# Patient Record
Sex: MALE | NOT HISPANIC OR LATINO | Employment: OTHER | URBAN - METROPOLITAN AREA
[De-identification: names, ages, dates, MRNs, and addresses within clinical notes are randomized per-mention and may not be internally consistent; named-entity substitution may affect disease eponyms.]

---

## 2023-09-15 PROBLEM — I10 ESSENTIAL HYPERTENSION: Status: ACTIVE | Noted: 2023-09-15

## 2023-09-15 RX ORDER — DEXTROAMPHETAMINE SACCHARATE, AMPHETAMINE ASPARTATE, DEXTROAMPHETAMINE SULFATE AND AMPHETAMINE SULFATE 5; 5; 5; 5 MG/1; MG/1; MG/1; MG/1
20 TABLET ORAL 2 TIMES DAILY
COMMUNITY

## 2023-09-15 RX ORDER — EPINEPHRINE 0.3 MG/.3ML
1 INJECTION SUBCUTANEOUS
COMMUNITY
Start: 2016-09-22

## 2023-09-15 RX ORDER — ONDANSETRON 4 MG/1
TABLET, ORALLY DISINTEGRATING ORAL 3 TIMES DAILY
COMMUNITY
Start: 2018-11-15 | End: 2023-10-16 | Stop reason: WASHOUT

## 2023-09-15 RX ORDER — NORTRIPTYLINE HYDROCHLORIDE 10 MG/1
1 CAPSULE ORAL DAILY
COMMUNITY
End: 2023-10-16 | Stop reason: WASHOUT

## 2023-09-15 RX ORDER — ALBUTEROL SULFATE 90 UG/1
2 AEROSOL, METERED RESPIRATORY (INHALATION) EVERY 4 HOURS PRN
COMMUNITY
Start: 2016-11-08

## 2023-09-15 RX ORDER — DIVALPROEX SODIUM 500 MG/1
500 TABLET, FILM COATED, EXTENDED RELEASE ORAL 2 TIMES DAILY
COMMUNITY
End: 2023-10-16 | Stop reason: WASHOUT

## 2023-09-15 RX ORDER — CARISOPRODOL 350 MG/1
350 TABLET ORAL 3 TIMES DAILY
COMMUNITY
End: 2023-10-16 | Stop reason: WASHOUT

## 2023-09-15 RX ORDER — GABAPENTIN 800 MG/1
800 TABLET ORAL 3 TIMES DAILY
COMMUNITY
End: 2023-10-05

## 2023-09-15 RX ORDER — TESTOSTERONE CYPIONATE 1000 MG/10ML
100 INJECTION, SOLUTION INTRAMUSCULAR WEEKLY
COMMUNITY
Start: 2018-12-28

## 2023-09-15 RX ORDER — DILTIAZEM HYDROCHLORIDE 180 MG/1
1 CAPSULE, COATED, EXTENDED RELEASE ORAL DAILY
COMMUNITY
End: 2023-10-16 | Stop reason: WASHOUT

## 2023-09-15 RX ORDER — CLONIDINE HYDROCHLORIDE 0.1 MG/1
0.1 TABLET ORAL DAILY
COMMUNITY
End: 2023-10-16 | Stop reason: WASHOUT

## 2023-09-15 RX ORDER — NALOXONE HYDROCHLORIDE 4 MG/.1ML
4 SPRAY NASAL
COMMUNITY
Start: 2021-07-27

## 2023-09-15 RX ORDER — ROPINIROLE 1 MG/1
1 TABLET, FILM COATED ORAL
COMMUNITY
Start: 2016-03-10 | End: 2023-10-16 | Stop reason: WASHOUT

## 2023-10-05 DIAGNOSIS — M25.561 CHRONIC PAIN OF BOTH KNEES: ICD-10-CM

## 2023-10-05 DIAGNOSIS — G89.29 CHRONIC PAIN OF BOTH KNEES: ICD-10-CM

## 2023-10-05 DIAGNOSIS — M79.672 LEFT FOOT PAIN: Primary | ICD-10-CM

## 2023-10-05 DIAGNOSIS — M54.12 RADICULOPATHY, CERVICAL: ICD-10-CM

## 2023-10-05 DIAGNOSIS — M25.562 CHRONIC PAIN OF BOTH KNEES: ICD-10-CM

## 2023-10-05 RX ORDER — GABAPENTIN 800 MG/1
800 TABLET ORAL 3 TIMES DAILY
Qty: 90 TABLET | Refills: 2 | Status: SHIPPED | OUTPATIENT
Start: 2023-10-05 | End: 2023-10-16 | Stop reason: SDUPTHER

## 2023-10-16 ENCOUNTER — OFFICE VISIT (OUTPATIENT)
Dept: PAIN MEDICINE | Facility: CLINIC | Age: 43
End: 2023-10-16
Payer: MEDICARE

## 2023-10-16 VITALS
SYSTOLIC BLOOD PRESSURE: 136 MMHG | HEIGHT: 75 IN | WEIGHT: 240 LBS | DIASTOLIC BLOOD PRESSURE: 110 MMHG | BODY MASS INDEX: 29.84 KG/M2 | HEART RATE: 108 BPM

## 2023-10-16 DIAGNOSIS — M54.12 CERVICAL RADICULOPATHY: ICD-10-CM

## 2023-10-16 DIAGNOSIS — M54.12 RADICULOPATHY, CERVICAL: ICD-10-CM

## 2023-10-16 DIAGNOSIS — M25.562 CHRONIC PAIN OF BOTH KNEES: Primary | ICD-10-CM

## 2023-10-16 DIAGNOSIS — M25.561 CHRONIC PAIN OF BOTH KNEES: Primary | ICD-10-CM

## 2023-10-16 DIAGNOSIS — Z98.1 HISTORY OF ANKLE FUSION: ICD-10-CM

## 2023-10-16 DIAGNOSIS — G89.29 CHRONIC PAIN OF BOTH KNEES: Primary | ICD-10-CM

## 2023-10-16 PROCEDURE — 3075F SYST BP GE 130 - 139MM HG: CPT | Performed by: PHYSICIAN ASSISTANT

## 2023-10-16 PROCEDURE — 3080F DIAST BP >= 90 MM HG: CPT | Performed by: PHYSICIAN ASSISTANT

## 2023-10-16 PROCEDURE — 99214 OFFICE O/P EST MOD 30 MIN: CPT | Performed by: PHYSICIAN ASSISTANT

## 2023-10-16 RX ORDER — TADALAFIL 5 MG/1
5 TABLET ORAL
COMMUNITY
Start: 2023-03-13

## 2023-10-16 RX ORDER — OXYCODONE AND ACETAMINOPHEN 5; 325 MG/1; MG/1
1 TABLET ORAL EVERY 6 HOURS PRN
Qty: 100 TABLET | Refills: 0 | Status: SHIPPED | OUTPATIENT
Start: 2023-10-16 | End: 2023-11-21 | Stop reason: WASHOUT

## 2023-10-16 RX ORDER — GABAPENTIN 800 MG/1
800 TABLET ORAL 3 TIMES DAILY
Qty: 90 TABLET | Refills: 2 | Status: SHIPPED | OUTPATIENT
Start: 2023-10-16 | End: 2024-02-29 | Stop reason: SDUPTHER

## 2023-10-16 RX ORDER — OXYCODONE AND ACETAMINOPHEN 5; 325 MG/1; MG/1
TABLET ORAL
COMMUNITY
Start: 2023-09-18 | End: 2023-10-16 | Stop reason: SDUPTHER

## 2023-10-16 RX ORDER — PREDNISONE 20 MG/1
40 TABLET ORAL DAILY
COMMUNITY
Start: 2023-08-28 | End: 2023-09-02

## 2023-10-16 ASSESSMENT — ENCOUNTER SYMPTOMS
DIARRHEA: 0
UNEXPECTED WEIGHT CHANGE: 0
NAUSEA: 0
VOMITING: 0
NECK PAIN: 1
ARTHRALGIAS: 1
COUGH: 0
PALPITATIONS: 0
CHILLS: 0
ACTIVITY CHANGE: 0
VOICE CHANGE: 0
SHORTNESS OF BREATH: 0
FATIGUE: 0
SLEEP DISTURBANCE: 0
FEVER: 0
WHEEZING: 0

## 2023-10-16 ASSESSMENT — PATIENT HEALTH QUESTIONNAIRE - PHQ9
SUM OF ALL RESPONSES TO PHQ9 QUESTIONS 1 AND 2: 1
1. LITTLE INTEREST OR PLEASURE IN DOING THINGS: NOT AT ALL
2. FEELING DOWN, DEPRESSED OR HOPELESS: SEVERAL DAYS

## 2023-10-16 ASSESSMENT — PAIN - FUNCTIONAL ASSESSMENT: PAIN_FUNCTIONAL_ASSESSMENT: 0-10

## 2023-10-16 ASSESSMENT — PAIN SCALES - GENERAL: PAINLEVEL_OUTOF10: 8

## 2023-10-16 ASSESSMENT — LIFESTYLE VARIABLES: TOTAL SCORE: 7

## 2023-10-16 ASSESSMENT — PAIN DESCRIPTION - DESCRIPTORS: DESCRIPTORS: SHARP;STABBING;ACHING;DULL

## 2023-10-16 NOTE — PROGRESS NOTES
Subjective   Patient ID: Adam Dexter is a 43 y.o. male who presents for Follow-up.  Patient is a 43-year-old male bilateral knee pain cervical disc disease with radiculopathy bilateral foot multiple surgeries and arthrodesis in the ankles bilateral knee pain hip pain that presents today for follow-up.  Patient did notes that his [facility] facility [facility]  He will be having a final arthrodesis on his left foot as this is likely the cause of the increased left hip pain.  Per his podiatrist.  He will be needing a right final arthrodesis as well.  He has been meeting with [facility] about disc replacement in the neck.  Patient has been putting on weight due to his frustrations and new onset of food allergies.  He is working with an immunologist for this.  He has had to use the EpiPen more in his life over the past 2 months than he has ever had.  He is having difficulty with finding foods that do not cause aggravation to his system.  Patient has been utilizing his medications as prescribed denies any adverse reactions to the utilization of the medication.  Reports that his foot surgeon is getting give him 2 weeks once they figure out a surgical date for him to move forward with this procedure.  Patient is nervous that this will cause issues with postoperative pain medications as the pharmacy level has had issues in the past        Review of Systems   Constitutional:  Negative for activity change, chills, fatigue, fever and unexpected weight change.   HENT:  Negative for ear pain and voice change.    Eyes:  Negative for visual disturbance.   Respiratory:  Negative for cough, shortness of breath and wheezing.    Cardiovascular:  Negative for chest pain and palpitations.   Gastrointestinal:  Negative for diarrhea, nausea and vomiting.   Musculoskeletal:  Positive for arthralgias, gait problem and neck pain.   Psychiatric/Behavioral:  Negative for behavioral  problems, self-injury, sleep disturbance and suicidal ideas.        Objective   Physical Exam  Vitals reviewed.   Constitutional:       Appearance: Normal appearance.   HENT:      Head: Normocephalic and atraumatic.      Mouth/Throat:      Mouth: Mucous membranes are moist.   Neck:      Vascular: No JVD.   Pulmonary:      Effort: Pulmonary effort is normal. No tachypnea or bradypnea.   Abdominal:      Palpations: Abdomen is soft.   Musculoskeletal:      Right foot: Decreased range of motion. Tenderness present.      Left foot: Decreased range of motion. Tenderness present.      Comments: Multi surgical scars noted. Tenderness the bilateral joint lines.    Skin:     General: Skin is warm and dry.   Neurological:      Mental Status: He is alert and oriented to person, place, and time.   Psychiatric:         Mood and Affect: Mood normal.         Behavior: Behavior normal. Behavior is cooperative.       Assessment/Plan   Problem List Items Addressed This Visit             ICD-10-CM    History of ankle fusion Z98.1     I had nice discussion with the patient today our plan will be as follows.      Radiology: [ none at this time ]      Physically:  [ continue modification of activities, healthy lifestyle choice we talked about being cautious after surgical procedure with concurrent cervical issues that will need to be addressed talked about modification of activities we talked about healthy lifestyle choices we talked about weight loss. ]      Psychologically:  [ No acute psychological concerns ]      Medication: [I will refill the medications at the same dose and frequency. We will continue to monitor the patient bimonthly for compliance, adverse reaction or interations The patient continues to see benefit and improvement in their quality of life and ability to maintain ADLs. Patient educated about the risks of taking opioids and operating a motor vehicle. Patient reports no adverse side effects to current medication  regimen. Current regimen does allow patient to maintain ADLs. Oarrs has been reviewed. No suspicion of diversion or abuse. Compliance with medication regime, no use of illicit drugs, no sharing of narcotic medications with others, do not use others narcotic medication, and to avoid alcohol use. Patient has been educated on the risks, benefits, and alternatives of controlled substances as well as the proper way to store these medications.   The patient and I discussed the nature of this medication and its side effects. We discussed tolerance, physical dependence, psychological dependence, addiction and opioid-induced hyperalgesia   Toxscreen  screen reviewed and compliant]      Duration:  [ 2 months ]      Intervention:  [Patient will keep us abreast as soon as his surgical procedure for his final left arthrodesis will be completed.  Patient then will have to move for the next set of right arthrodesis.  These will be done at the [facility].  Once we have a surgical date then I will do my best to alter patient's prescriptions to allow for pain control.  Patient will have to give us as much notice as possible so that we can move this at the pharmacy level to reduce chances of issues that have happened in the past with prior surgeries.  If he could not clinic physician does feel that he is more apt to handling the postsurgical pain or would like to take the lead on this he can definitely contact me through the EMR or office notations.  If not I am going to most likely change patient to a Percocet 10 varying frequency and 7 to 14-day prescriptions as we titrate back down to baseline I believe that this will potentially be able to happen within less than 3 weeks.  Patient is also going to be following up with a cervical region after both feet have been completed for his potential Mobi-C disc replacement surgery]           Cervical radiculopathy M54.12    Bilateral knee pain - Primary M25.561, M25.562     Other Visit  Diagnoses         Codes    Radiculopathy, cervical     M54.12

## 2023-10-16 NOTE — PROGRESS NOTES
MEDICATION NAME: PERCOCET  STRENGTH: 5-325  LAST FILL DATE: 23  DATE LAST TAKEN: 10/16/2023 @ 0630  QUANTITY FILLED: 100  QUANTITY REMAIN  COUNT COMPLETED BY: DANAY GAONA MA and MARLENA CERDA      UDS LAST COMPLETED:   CONTROLLED SUBSTANCES AGREEMENT LAST SIGNED:   ORT LAST COMPLETED:  Modified Osw[employer] disability form filled out annually.

## 2023-10-16 NOTE — ASSESSMENT & PLAN NOTE
I had nice discussion with the patient today our plan will be as follows.      Radiology: [ none at this time ]      Physically:  [ continue modification of activities, healthy lifestyle choice we talked about being cautious after surgical procedure with concurrent cervical issues that will need to be addressed talked about modification of activities we talked about healthy lifestyle choices we talked about weight loss. ]      Psychologically:  [ No acute psychological concerns ]      Medication: [I will refill the medications at the same dose and frequency. We will continue to monitor the patient bimonthly for compliance, adverse reaction or interations The patient continues to see benefit and improvement in their quality of life and ability to maintain ADLs. Patient educated about the risks of taking opioids and operating a motor vehicle. Patient reports no adverse side effects to current medication regimen. Current regimen does allow patient to maintain ADLs. Oarrs has been reviewed. No suspicion of diversion or abuse. Compliance with medication regime, no use of illicit drugs, no sharing of narcotic medications with others, do not use others narcotic medication, and to avoid alcohol use. Patient has been educated on the risks, benefits, and alternatives of controlled substances as well as the proper way to store these medications.   The patient and I discussed the nature of this medication and its side effects. We discussed tolerance, physical dependence, psychological dependence, addiction and opioid-induced hyperalgesia   Toxscreen  screen reviewed and compliant]      Duration:  [ 2 months ]      Intervention:  [Patient will keep us abreast as soon as his surgical procedure for his final left arthrodesis will be completed.  Patient then will have to move for the next set of right arthrodesis.  These will be done at the [facility].  Once we have a surgical date then I will do my best to alter patient's  prescriptions to allow for pain control.  Patient will have to give us as much notice as possible so that we can move this at the pharmacy level to reduce chances of issues that have happened in the past with prior surgeries.  If he could not clinic physician does feel that he is more apt to handling the postsurgical pain or would like to take the lead on this he can definitely contact me through the EMR or office notations.  If not I am going to most likely change patient to a Percocet 10 varying frequency and 7 to 14-day prescriptions as we titrate back down to baseline I believe that this will potentially be able to happen within less than 3 weeks.  Patient is also going to be following up with a cervical region after both feet have been completed for his potential Mobi-C disc replacement surgery]

## 2023-11-14 DIAGNOSIS — Z98.1 HISTORY OF ANKLE FUSION: ICD-10-CM

## 2023-11-14 DIAGNOSIS — M54.12 CERVICAL RADICULOPATHY: ICD-10-CM

## 2023-11-14 DIAGNOSIS — M25.561 CHRONIC PAIN OF BOTH KNEES: ICD-10-CM

## 2023-11-14 DIAGNOSIS — G89.29 CHRONIC PAIN OF BOTH KNEES: ICD-10-CM

## 2023-11-14 DIAGNOSIS — M25.562 CHRONIC PAIN OF BOTH KNEES: ICD-10-CM

## 2023-11-14 DIAGNOSIS — M54.12 RADICULOPATHY, CERVICAL: ICD-10-CM

## 2023-11-14 RX ORDER — OXYCODONE AND ACETAMINOPHEN 5; 325 MG/1; MG/1
1 TABLET ORAL EVERY 6 HOURS PRN
Qty: 100 TABLET | Refills: 0 | Status: CANCELLED | OUTPATIENT
Start: 2023-11-14 | End: 2023-12-14

## 2023-11-14 NOTE — TELEPHONE ENCOUNTER
PT.'S WIFE CALLED STATING THAT THE PAIN MEDS ARE NOT STRONG ENOUGH FOR HER .  WIFE STATESA HER  IS A BIG MAN AND NEEDS SOMETHING STRONGER FOR PAIN.  SHE STATES HE CAN NOT GET OUT OF BED FOR THE LAST 2 WEEKS AND HAS NO QUALITY TO HIS LIFE.  HE HAS A PROCEDURE SCHEDULED FOR JANUARY AND CAN NOT SPEND ALL THAT TIME IN BED.  HE HAS A SCRIPT IN THE SYSTEM FOR REFILL ON 11/17/23.  SHE ALSO STATES THE PHARMACIST SHORTED HER  10 PILLS ON HIS LAST REFILL AND NOW DOES NOT HAVE PAIN MEDS.   PLEASE ADVISE.!

## 2023-11-20 DIAGNOSIS — R86.9 ABNORMAL SEMEN ANALYSIS: Primary | ICD-10-CM

## 2023-11-21 ENCOUNTER — OFFICE VISIT (OUTPATIENT)
Dept: PAIN MEDICINE | Facility: CLINIC | Age: 43
End: 2023-11-21
Payer: MEDICARE

## 2023-11-21 VITALS
SYSTOLIC BLOOD PRESSURE: 134 MMHG | HEIGHT: 75 IN | RESPIRATION RATE: 16 BRPM | HEART RATE: 74 BPM | DIASTOLIC BLOOD PRESSURE: 86 MMHG | BODY MASS INDEX: 29.84 KG/M2 | WEIGHT: 240 LBS

## 2023-11-21 DIAGNOSIS — M50.30 DDD (DEGENERATIVE DISC DISEASE), CERVICAL: ICD-10-CM

## 2023-11-21 DIAGNOSIS — G89.29 CHRONIC PAIN OF BOTH KNEES: Primary | ICD-10-CM

## 2023-11-21 DIAGNOSIS — M54.12 RADICULOPATHY, CERVICAL: ICD-10-CM

## 2023-11-21 DIAGNOSIS — M54.12 CERVICAL RADICULOPATHY: ICD-10-CM

## 2023-11-21 DIAGNOSIS — M25.562 CHRONIC PAIN OF BOTH KNEES: Primary | ICD-10-CM

## 2023-11-21 DIAGNOSIS — M25.572 PAIN OF JOINT OF LEFT ANKLE AND FOOT: ICD-10-CM

## 2023-11-21 DIAGNOSIS — Z98.1 HISTORY OF ANKLE FUSION: ICD-10-CM

## 2023-11-21 DIAGNOSIS — M25.561 CHRONIC PAIN OF BOTH KNEES: Primary | ICD-10-CM

## 2023-11-21 PROBLEM — F17.200 CURRENT EVERY DAY SMOKER: Status: ACTIVE | Noted: 2023-11-21

## 2023-11-21 PROBLEM — N48.6 INDURATION PENIS PLASTICA: Status: ACTIVE | Noted: 2023-11-21

## 2023-11-21 PROBLEM — K27.9 PUD (PEPTIC ULCER DISEASE): Status: ACTIVE | Noted: 2023-11-21

## 2023-11-21 PROBLEM — M25.579 ANKLE JOINT PAIN: Status: ACTIVE | Noted: 2023-11-21

## 2023-11-21 PROBLEM — R05.9 COUGH: Status: ACTIVE | Noted: 2017-10-19

## 2023-11-21 PROBLEM — F43.10 POST-TRAUMATIC STRESS DISORDER, UNSPECIFIED: Status: ACTIVE | Noted: 2023-11-21

## 2023-11-21 PROBLEM — G44.229 CHRONIC TENSION-TYPE HEADACHE: Status: ACTIVE | Noted: 2023-11-21

## 2023-11-21 PROBLEM — M47.892 OTHER SPONDYLOSIS, CERVICAL REGION: Status: ACTIVE | Noted: 2023-11-21

## 2023-11-21 PROBLEM — K08.9 DISORDER OF TEETH AND SUPPORTING STRUCTURES: Status: ACTIVE | Noted: 2023-11-21

## 2023-11-21 PROBLEM — N48.6 PEYRONIE'S DISEASE: Status: ACTIVE | Noted: 2022-04-20

## 2023-11-21 PROBLEM — F17.200 NICOTINE DEPENDENCE: Status: ACTIVE | Noted: 2023-11-21

## 2023-11-21 PROBLEM — F43.10 POSTTRAUMATIC STRESS DISORDER: Status: ACTIVE | Noted: 2023-11-21

## 2023-11-21 PROBLEM — G43.909 MIGRAINE: Status: ACTIVE | Noted: 2023-11-21

## 2023-11-21 PROBLEM — M21.40 FLAT FOOT (PES PLANUS) (ACQUIRED), UNSPECIFIED FOOT: Status: ACTIVE | Noted: 2023-11-21

## 2023-11-21 PROBLEM — M54.50 LOW BACK PAIN: Status: ACTIVE | Noted: 2023-11-21

## 2023-11-21 PROBLEM — M25.579 PAIN IN UNSPECIFIED ANKLE AND JOINTS OF UNSPECIFIED FOOT: Status: ACTIVE | Noted: 2023-11-21

## 2023-11-21 PROBLEM — K37 APPENDICITIS: Status: ACTIVE | Noted: 2021-08-03

## 2023-11-21 PROBLEM — M47.812 CERVICAL SPONDYLOSIS: Status: ACTIVE | Noted: 2023-11-21

## 2023-11-21 PROBLEM — J45.909 ASTHMA (HHS-HCC): Status: ACTIVE | Noted: 2023-11-21

## 2023-11-21 PROBLEM — F43.21 ADJUSTMENT DISORDER WITH DEPRESSED MOOD: Status: ACTIVE | Noted: 2023-11-21

## 2023-11-21 PROBLEM — L98.9 DISORDER OF THE SKIN AND SUBCUTANEOUS TISSUE, UNSPECIFIED: Status: ACTIVE | Noted: 2023-11-21

## 2023-11-21 PROBLEM — M21.40 PES PLANUS: Status: ACTIVE | Noted: 2023-11-21

## 2023-11-21 PROBLEM — N52.9 ED (ERECTILE DYSFUNCTION) OF ORGANIC ORIGIN: Status: ACTIVE | Noted: 2022-04-20

## 2023-11-21 PROBLEM — F90.9 ADULT ATTENTION DEFICIT HYPERACTIVITY DISORDER: Status: ACTIVE | Noted: 2023-11-21

## 2023-11-21 PROBLEM — H52.7 REFRACTIVE ERROR: Status: ACTIVE | Noted: 2023-11-21

## 2023-11-21 PROBLEM — T78.40XA ALLERGIES: Status: ACTIVE | Noted: 2023-11-21

## 2023-11-21 PROBLEM — M21.6X1 ACQUIRED EQUINUS DEFORMITY OF RIGHT FOOT: Status: ACTIVE | Noted: 2018-10-18

## 2023-11-21 PROBLEM — M77.9 TENDONITIS: Status: ACTIVE | Noted: 2017-11-08

## 2023-11-21 PROBLEM — S93.316A: Status: ACTIVE | Noted: 2023-11-21

## 2023-11-21 PROBLEM — M25.569 ARTHRALGIA OF KNEE: Status: ACTIVE | Noted: 2023-11-21

## 2023-11-21 PROBLEM — G47.30 SLEEP APNEA: Status: ACTIVE | Noted: 2023-11-21

## 2023-11-21 PROBLEM — Z91.018 ALLERGY TO OTHER FOODS: Status: ACTIVE | Noted: 2023-11-21

## 2023-11-21 PROBLEM — M54.2 CERVICALGIA: Status: ACTIVE | Noted: 2023-11-21

## 2023-11-21 PROBLEM — J20.9 ACUTE BRONCHITIS: Status: ACTIVE | Noted: 2017-10-19

## 2023-11-21 PROBLEM — E29.1 TESTICULAR HYPOFUNCTION: Status: ACTIVE | Noted: 2023-11-21

## 2023-11-21 PROCEDURE — 99214 OFFICE O/P EST MOD 30 MIN: CPT | Performed by: PHYSICIAN ASSISTANT

## 2023-11-21 PROCEDURE — 3075F SYST BP GE 130 - 139MM HG: CPT | Performed by: PHYSICIAN ASSISTANT

## 2023-11-21 PROCEDURE — 3079F DIAST BP 80-89 MM HG: CPT | Performed by: PHYSICIAN ASSISTANT

## 2023-11-21 RX ORDER — OXYCODONE AND ACETAMINOPHEN 7.5; 325 MG/1; MG/1
1 TABLET ORAL EVERY 8 HOURS PRN
Qty: 90 TABLET | Refills: 0 | Status: SHIPPED | OUTPATIENT
Start: 2023-11-21 | End: 2023-12-21 | Stop reason: WASHOUT

## 2023-11-21 ASSESSMENT — PAIN SCALES - GENERAL
PAINLEVEL_OUTOF10: 8
PAINLEVEL: 8

## 2023-11-21 ASSESSMENT — ENCOUNTER SYMPTOMS
ACTIVITY CHANGE: 0
SLEEP DISTURBANCE: 0
CHILLS: 0
VOMITING: 0
FATIGUE: 0
NAUSEA: 0
DIARRHEA: 0
PALPITATIONS: 0
VOICE CHANGE: 0
WHEEZING: 0
NECK PAIN: 1
ARTHRALGIAS: 1
SHORTNESS OF BREATH: 0
COUGH: 0
FEVER: 0
UNEXPECTED WEIGHT CHANGE: 0

## 2023-11-21 ASSESSMENT — PAIN - FUNCTIONAL ASSESSMENT: PAIN_FUNCTIONAL_ASSESSMENT: 0-10

## 2023-11-21 ASSESSMENT — PAIN DESCRIPTION - DESCRIPTORS: DESCRIPTORS: SHARP;STABBING;RADIATING

## 2023-11-21 NOTE — ASSESSMENT & PLAN NOTE
I had nice discussion with the patient today our plan will be as follows.      Radiology: [ none at this time ]      Physically:  [ continue modification of activities, healthy lifestyle choice ]      Psychologically:  [ No acute psychological concerns ]      Medication: [Reviewed with my supervising physician about a small increase in medication to the patient surgical procedure patient will be switched to a 7.5 mg tablet a hours as needed. We will continue to monitor the patient bimonthly for compliance, adverse reaction or interations The patient continues to see benefit and improvement in their quality of life and ability to maintain ADLs. Patient educated about the risks of taking opioids and operating a motor vehicle. Patient reports no adverse side effects to current medication regimen. Current regimen does allow patient to maintain ADLs. Oarrs has been reviewed. No suspicion of diversion or abuse. Compliance with medication regime, no use of illicit drugs, no sharing of narcotic medications with others, do not use others narcotic medication, and to avoid alcohol use. Patient has been educated on the risks, benefits, and alternatives of controlled substances as well as the proper way to store these medications.   The patient and I discussed the nature of this medication and its side effects. We discussed tolerance, physical dependence, psychological dependence, addiction and opioid-induced hyperalgesia  Patient will need to continue to work with the pharmacy about the triple checks that have been placed.  Patient should also continue to Verify his medications prior to leaving the pharmacy. ]      Duration:  [ 2 months ]      Intervention:  [ none at this time. Patient is stable.  ]

## 2023-11-21 NOTE — PROGRESS NOTES
MEDICATION NAME: percocet  STRENGTH: 5/325mg  LAST FILL DATE: 23  DATE LAST TAKEN: 23  QUANTITY FILLED: 100  QUANTITY REMAIN  COUNT COMPLETED BY: BRICE RODRIGUEZ LPN and CHAPO ZHENG RN      CONTROLLED SUBSTANCES AGREEMENT LAST SIGNED: 2023  ORT LAST COMPLETED:10/2023  Modified Osw[employer] disability form filled out annually.

## 2023-11-21 NOTE — PROGRESS NOTES
Subjective   Patient ID: Adam Dexter is a 43 y.o. male who presents for feet, knees and hips pain.  Patient is a 43-year-old male with bilateral ankle pain bilateral knee pain DDD and cervical radiculopathy presents today for follow-up.  Patient did notes that his surgical procedure in the left foot is continually being delayed this [facility].  Patient did notes that the pharmacy had shorted him 10 tablets.  He did speak to the pharmacy manager and they did noted that there internal audit showed an negative of approximately 36 tablets.  Patient denies that his pain level is being less moderated by the medications.  The patient has peptic ulcer disease and has issues with taking anti-inflammatory.  Patient is having difficulty with ambulation bilateral ankle pain.  Reportedly knows that he needs to reduce amount of physical activity until surgical procedures.  But he is a lot of activities that need to be completed.        Review of Systems   Constitutional:  Negative for activity change, chills, fatigue, fever and unexpected weight change.   HENT:  Negative for ear pain and voice change.    Eyes:  Negative for visual disturbance.   Respiratory:  Negative for cough, shortness of breath and wheezing.    Cardiovascular:  Negative for chest pain and palpitations.   Gastrointestinal:  Negative for diarrhea, nausea and vomiting.   Musculoskeletal:  Positive for arthralgias, gait problem and neck pain.   Psychiatric/Behavioral:  Negative for behavioral problems, self-injury, sleep disturbance and suicidal ideas.        Objective   Physical Exam  Vitals reviewed.   Constitutional:       Appearance: Normal appearance.   HENT:      Head: Normocephalic and atraumatic.      Mouth/Throat:      Mouth: Mucous membranes are moist.   Neck:      Vascular: No JVD.   Pulmonary:      Effort: Pulmonary effort is normal. No tachypnea or bradypnea.   Abdominal:      Palpations:  Abdomen is soft.   Musculoskeletal:      Right ankle: Tenderness present. Decreased range of motion.      Left ankle: Tenderness present. Decreased range of motion.      Comments: Antalgic gait noted on exam. Medial tenderness of the joint line of the bilateral knees.  Gross strength in bilateral lower extremities sensation grossly intact as well   Skin:     General: Skin is warm and dry.   Neurological:      Mental Status: He is alert and oriented to person, place, and time.   Psychiatric:         Mood and Affect: Mood normal.         Behavior: Behavior normal. Behavior is cooperative.         Assessment/Plan   Problem List Items Addressed This Visit             ICD-10-CM    History of ankle fusion Z98.1    Cervical radiculopathy M54.12    Bilateral knee pain - Primary M25.561, M25.562    DDD (degenerative disc disease), cervical M50.30     I had nice discussion with the patient today our plan will be as follows.      Radiology: [ none at this time ]      Physically:  [ continue modification of activities, healthy lifestyle choice ]      Psychologically:  [ No acute psychological concerns ]      Medication: [Reviewed with my supervising physician about a small increase in medication to the patient surgical procedure patient will be switched to a 7.5 mg tablet a hours as needed. We will continue to monitor the patient bimonthly for compliance, adverse reaction or interations The patient continues to see benefit and improvement in their quality of life and ability to maintain ADLs. Patient educated about the risks of taking opioids and operating a motor vehicle. Patient reports no adverse side effects to current medication regimen. Current regimen does allow patient to maintain ADLs. Oarrs has been reviewed. No suspicion of diversion or abuse. Compliance with medication regime, no use of illicit drugs, no sharing of narcotic medications with others, do not use others narcotic medication, and to avoid alcohol use.  Patient has been educated on the risks, benefits, and alternatives of controlled substances as well as the proper way to store these medications.   The patient and I discussed the nature of this medication and its side effects. We discussed tolerance, physical dependence, psychological dependence, addiction and opioid-induced hyperalgesia  Patient will need to continue to work with the pharmacy about the triple checks that have been placed.  Patient should also continue to Verify his medications prior to leaving the pharmacy. ]      Duration:  [ 2 months ]      Intervention:  [ none at this time. Patient is stable.  ]           Pain in unspecified ankle and joints of unspecified foot M25.579     Other Visit Diagnoses         Codes    Radiculopathy, cervical     M54.12

## 2023-12-07 ENCOUNTER — APPOINTMENT (OUTPATIENT)
Dept: PAIN MEDICINE | Facility: CLINIC | Age: 43
End: 2023-12-07
Payer: MEDICARE

## 2024-01-22 ENCOUNTER — OFFICE VISIT (OUTPATIENT)
Dept: PAIN MEDICINE | Facility: CLINIC | Age: 44
End: 2024-01-22
Payer: MEDICARE

## 2024-01-22 VITALS
WEIGHT: 245 LBS | SYSTOLIC BLOOD PRESSURE: 130 MMHG | BODY MASS INDEX: 31.44 KG/M2 | HEART RATE: 80 BPM | DIASTOLIC BLOOD PRESSURE: 95 MMHG | RESPIRATION RATE: 20 BRPM | HEIGHT: 74 IN
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
## 2024-01-22 DIAGNOSIS — M54.12 CERVICAL RADICULOPATHY: ICD-10-CM

## 2024-01-22 DIAGNOSIS — M25.571 ARTHRALGIA OF BOTH ANKLES: ICD-10-CM

## 2024-01-22 DIAGNOSIS — M50.30 DDD (DEGENERATIVE DISC DISEASE), CERVICAL: ICD-10-CM

## 2024-01-22 DIAGNOSIS — Z79.899 HISTORY OF ONGOING TREATMENT WITH HIGH-RISK MEDICATION: ICD-10-CM

## 2024-01-22 DIAGNOSIS — Z98.1 HISTORY OF ANKLE FUSION: ICD-10-CM

## 2024-01-22 DIAGNOSIS — F17.200 CURRENT EVERY DAY SMOKER: ICD-10-CM

## 2024-01-22 DIAGNOSIS — M47.812 CERVICAL SPONDYLOSIS: ICD-10-CM

## 2024-01-22 DIAGNOSIS — M25.572 ARTHRALGIA OF BOTH ANKLES: ICD-10-CM

## 2024-01-22 DIAGNOSIS — M25.511 ACUTE PAIN OF RIGHT SHOULDER: ICD-10-CM

## 2024-01-22 PROCEDURE — 99214 OFFICE O/P EST MOD 30 MIN: CPT | Performed by: PHYSICIAN ASSISTANT

## 2024-01-22 PROCEDURE — 99406 BEHAV CHNG SMOKING 3-10 MIN: CPT | Performed by: PHYSICIAN ASSISTANT

## 2024-01-22 PROCEDURE — 3075F SYST BP GE 130 - 139MM HG: CPT | Performed by: PHYSICIAN ASSISTANT

## 2024-01-22 PROCEDURE — 82570 ASSAY OF URINE CREATININE: CPT | Mod: 59 | Performed by: PHYSICIAN ASSISTANT

## 2024-01-22 PROCEDURE — 80346 BENZODIAZEPINES1-12: CPT | Performed by: PHYSICIAN ASSISTANT

## 2024-01-22 PROCEDURE — 3080F DIAST BP >= 90 MM HG: CPT | Performed by: PHYSICIAN ASSISTANT

## 2024-01-22 PROCEDURE — 4004F PT TOBACCO SCREEN RCVD TLK: CPT | Performed by: PHYSICIAN ASSISTANT

## 2024-01-22 RX ORDER — OXYCODONE AND ACETAMINOPHEN 7.5; 325 MG/1; MG/1
1 TABLET ORAL EVERY 8 HOURS PRN
Qty: 90 TABLET | Refills: 0 | Status: SHIPPED | OUTPATIENT
Start: 2024-01-22 | End: 2024-04-25 | Stop reason: SDUPTHER

## 2024-01-22 RX ORDER — OXYCODONE AND ACETAMINOPHEN 7.5; 325 MG/1; MG/1
1 TABLET ORAL EVERY 8 HOURS PRN
COMMUNITY
Start: 2024-01-11 | End: 2024-01-22 | Stop reason: SDUPTHER

## 2024-01-22 RX ORDER — OXYCODONE AND ACETAMINOPHEN 7.5; 325 MG/1; MG/1
1 TABLET ORAL EVERY 8 HOURS PRN
Qty: 90 TABLET | Refills: 0 | Status: SHIPPED | OUTPATIENT
Start: 2024-01-22 | End: 2024-02-29 | Stop reason: SDUPTHER

## 2024-01-22 ASSESSMENT — PAIN - FUNCTIONAL ASSESSMENT: PAIN_FUNCTIONAL_ASSESSMENT: 0-10

## 2024-01-22 ASSESSMENT — LIFESTYLE VARIABLES
HOW OFTEN DO YOU HAVE A DRINK CONTAINING ALCOHOL: NEVER
AUDIT-C TOTAL SCORE: 0
HOW OFTEN DO YOU HAVE SIX OR MORE DRINKS ON ONE OCCASION: NEVER
SKIP TO QUESTIONS 9-10: 1
HOW MANY STANDARD DRINKS CONTAINING ALCOHOL DO YOU HAVE ON A TYPICAL DAY: PATIENT DOES NOT DRINK

## 2024-01-22 ASSESSMENT — PAIN SCALES - GENERAL
PAINLEVEL: 7
PAINLEVEL_OUTOF10: 7

## 2024-01-22 ASSESSMENT — PAIN DESCRIPTION - DESCRIPTORS: DESCRIPTORS: SHOOTING;SHARP;STABBING

## 2024-01-22 ASSESSMENT — PATIENT HEALTH QUESTIONNAIRE - PHQ9
2. FEELING DOWN, DEPRESSED OR HOPELESS: NOT AT ALL
SUM OF ALL RESPONSES TO PHQ9 QUESTIONS 1 & 2: 0
1. LITTLE INTEREST OR PLEASURE IN DOING THINGS: NOT AT ALL

## 2024-01-22 NOTE — PROGRESS NOTES
SPO2 [id number]    MEDICATION NAME: Perceocet  STRENGTH: 7.5  LAST FILL DATE: 24  DATE LAST TAKEN: 24  QUANTITY FILLED: 90  QUANTITY REMAININ  COUNT COMPLETED BY: JAKOB Tucker and Daphnie RN

## 2024-01-22 NOTE — PROGRESS NOTES
Subjective   Patient ID: Adam eDxter is a 43 y.o. male who presents for Pain (Feet, knees, hip and ankle pain).  Patient is a 43-year-old male with history of bilateral ankle fusion cervical radiculopathy degenerative disc and spondylosis continued ankle pain the presents today for follow-up.  Patient did notes that he had a fall about 3 weeks ago.  Slipped on the stairs and impacted his right shoulder.  About 4 to 5 days his shoulder mobility returned and improved.  He still has continued pain.  He notes it is mostly lateral and deep colder pain.  Patient denies that range of motion does aggravate it.  [facility]  Patient has to call a surgeon for more details when this will take place.  Patient continues to have his ankle pain and have aggravation with ambulation.  Patient denotes that the 7.5 seemed to last about 3 and half hours in duration.  Patient has been making attempts to reduce his smoking he was at a pack per day and he is now about down to 5 to 7 cigarettes a day.  He does note that his stress level does tend to make it difficult to reduce further.        Review of Systems    Objective   Physical Exam    Assessment/Plan   Problem List Items Addressed This Visit             ICD-10-CM    History of ankle fusion Z98.1    Cervical radiculopathy M54.12    Ankle joint pain M25.579    Cervical spondylosis M47.812    DDD (degenerative disc disease), cervical M50.30     Other Visit Diagnoses         Codes    Fall, initial encounter    -  Primary W19.XXXA    Acute pain of right shoulder     M25.511        I had nice discussion with the patient today our plan will be as follows.      Radiology: [ none at this time ]      Physically:  [ continue modification of activities, healthy lifestyle choice, Patient smokes 5-7 cigs. Encouraged/counseled on smoking cessation as this  may increase systemic inflammatory factors which may contribute to patient's chronic pain in addition to smoking as generalized health detriments.  Gave Hep shoulder  ]      Psychologically:  [ No acute psychological concerns ]      Medication: [I will refill the medications at the same dose and frequency. We will continue to monitor the patient bimonthly for compliance, adverse reaction or interations The patient continues to see benefit and improvement in their quality of life and ability to maintain ADLs. Patient educated about the risks of taking opioids and operating a motor vehicle. Patient reports no adverse side effects to current medication regimen. Current regimen does allow patient to maintain ADLs. Oarrs has been reviewed. No suspicion of diversion or abuse. Compliance with medication regime, no use of illicit drugs, no sharing of narcotic medications with others, do not use others narcotic medication, and to avoid alcohol use. Patient has been educated on the risks, benefits, and alternatives of controlled substances as well as the proper way to store these medications.   The patient and I discussed the nature of this medication and its side effects. We discussed tolerance, physical dependence, psychological dependence, addiction and opioid-induced hyperalgesia   Patient is submit tox screen]      Duration:  [ 2 months ]      Intervention:  [ none at this time. Patient is stable.   ]           Adam Herrera PA-C 01/22/24 8:47 AM

## 2024-01-23 LAB
AMPHETAMINES UR QL SCN: NORMAL
BARBITURATES UR QL SCN: NORMAL
BZE UR QL SCN: NORMAL
CANNABINOIDS UR QL SCN: NORMAL
CREAT UR-MCNC: 43.4 MG/DL (ref 20–370)
PCP UR QL SCN: NORMAL

## 2024-01-26 LAB
1OH-MIDAZOLAM UR CFM-MCNC: <25 NG/ML
6MAM UR CFM-MCNC: <25 NG/ML
7AMINOCLONAZEPAM UR CFM-MCNC: <25 NG/ML
A-OH ALPRAZ UR CFM-MCNC: <25 NG/ML
ALPRAZ UR CFM-MCNC: <25 NG/ML
CHLORDIAZEP UR CFM-MCNC: <25 NG/ML
CLONAZEPAM UR CFM-MCNC: <25 NG/ML
CODEINE UR CFM-MCNC: <50 NG/ML
DIAZEPAM UR CFM-MCNC: <25 NG/ML
EDDP UR CFM-MCNC: <25 NG/ML
FENTANYL UR CFM-MCNC: <2.5 NG/ML
HYDROCODONE CTO UR CFM-MCNC: <25 NG/ML
HYDROMORPHONE UR CFM-MCNC: <25 NG/ML
LORAZEPAM UR CFM-MCNC: <25 NG/ML
METHADONE UR CFM-MCNC: <25 NG/ML
MIDAZOLAM UR CFM-MCNC: <25 NG/ML
MORPHINE UR CFM-MCNC: <50 NG/ML
NORDIAZEPAM UR CFM-MCNC: <25 NG/ML
NORFENTANYL UR CFM-MCNC: <2.5 NG/ML
NORHYDROCODONE UR CFM-MCNC: <25 NG/ML
NOROXYCODONE UR CFM-MCNC: >1000 NG/ML
NORTRAMADOL UR-MCNC: <50 NG/ML
OXAZEPAM UR CFM-MCNC: <25 NG/ML
OXYCODONE UR CFM-MCNC: 475 NG/ML
OXYMORPHONE UR CFM-MCNC: 222 NG/ML
TEMAZEPAM UR CFM-MCNC: <25 NG/ML
TRAMADOL UR CFM-MCNC: <50 NG/ML
ZOLPIDEM UR CFM-MCNC: <25 NG/ML
ZOLPIDEM UR-MCNC: <25 NG/ML

## 2024-02-29 ENCOUNTER — OFFICE VISIT (OUTPATIENT)
Dept: PAIN MEDICINE | Facility: CLINIC | Age: 44
End: 2024-02-29
Payer: MEDICARE

## 2024-02-29 VITALS
HEART RATE: 80 BPM | DIASTOLIC BLOOD PRESSURE: 91 MMHG | SYSTOLIC BLOOD PRESSURE: 130 MMHG | WEIGHT: 244.71 LBS | BODY MASS INDEX: 31.41 KG/M2 | HEIGHT: 74 IN

## 2024-02-29 DIAGNOSIS — M25.511 ACUTE PAIN OF RIGHT SHOULDER: ICD-10-CM

## 2024-02-29 DIAGNOSIS — F17.200 CURRENT EVERY DAY SMOKER: ICD-10-CM

## 2024-02-29 DIAGNOSIS — M25.571 ARTHRALGIA OF BOTH ANKLES: ICD-10-CM

## 2024-02-29 DIAGNOSIS — M54.12 CERVICAL RADICULOPATHY: ICD-10-CM
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
## 2024-02-29 DIAGNOSIS — G89.29 CHRONIC PAIN OF BOTH KNEES: ICD-10-CM

## 2024-02-29 DIAGNOSIS — M54.12 RADICULOPATHY, CERVICAL: ICD-10-CM

## 2024-02-29 DIAGNOSIS — Z98.1 HISTORY OF ANKLE FUSION: ICD-10-CM

## 2024-02-29 DIAGNOSIS — M25.572 ARTHRALGIA OF BOTH ANKLES: ICD-10-CM

## 2024-02-29 DIAGNOSIS — M50.30 DDD (DEGENERATIVE DISC DISEASE), CERVICAL: Primary | ICD-10-CM

## 2024-02-29 DIAGNOSIS — M25.561 CHRONIC PAIN OF BOTH KNEES: ICD-10-CM

## 2024-02-29 DIAGNOSIS — M47.812 CERVICAL SPONDYLOSIS: ICD-10-CM

## 2024-02-29 DIAGNOSIS — M25.562 CHRONIC PAIN OF BOTH KNEES: ICD-10-CM

## 2024-02-29 PROCEDURE — 99214 OFFICE O/P EST MOD 30 MIN: CPT | Performed by: PHYSICIAN ASSISTANT

## 2024-02-29 PROCEDURE — 3080F DIAST BP >= 90 MM HG: CPT | Performed by: PHYSICIAN ASSISTANT

## 2024-02-29 PROCEDURE — 3075F SYST BP GE 130 - 139MM HG: CPT | Performed by: PHYSICIAN ASSISTANT

## 2024-02-29 PROCEDURE — 4004F PT TOBACCO SCREEN RCVD TLK: CPT | Performed by: PHYSICIAN ASSISTANT

## 2024-02-29 RX ORDER — OXYCODONE AND ACETAMINOPHEN 7.5; 325 MG/1; MG/1
1 TABLET ORAL EVERY 8 HOURS PRN
COMMUNITY
End: 2024-02-29 | Stop reason: SDUPTHER

## 2024-02-29 RX ORDER — OXYCODONE AND ACETAMINOPHEN 7.5; 325 MG/1; MG/1
1 TABLET ORAL EVERY 8 HOURS PRN
Qty: 90 TABLET | Refills: 0 | Status: SHIPPED | OUTPATIENT
Start: 2024-02-29 | End: 2024-04-25 | Stop reason: SDUPTHER

## 2024-02-29 RX ORDER — GABAPENTIN 800 MG/1
800 TABLET ORAL 3 TIMES DAILY
Qty: 90 TABLET | Refills: 2 | Status: SHIPPED | OUTPATIENT
Start: 2024-02-29

## 2024-02-29 RX ORDER — OXYCODONE AND ACETAMINOPHEN 7.5; 325 MG/1; MG/1
1 TABLET ORAL EVERY 8 HOURS PRN
Qty: 90 TABLET | Refills: 0 | Status: SHIPPED | OUTPATIENT
Start: 2024-02-29 | End: 2024-04-25 | Stop reason: WASHOUT

## 2024-02-29 ASSESSMENT — ENCOUNTER SYMPTOMS
LOSS OF SENSATION IN FEET: 0
CHILLS: 0
ACTIVITY CHANGE: 0
FEVER: 0
SHORTNESS OF BREATH: 0
PALPITATIONS: 0
VOMITING: 0
UNEXPECTED WEIGHT CHANGE: 0
COUGH: 0
ARTHRALGIAS: 1
WHEEZING: 0
FATIGUE: 0
DEPRESSION: 0
NECK PAIN: 1
SLEEP DISTURBANCE: 0
VOICE CHANGE: 0
NAUSEA: 0
DIARRHEA: 0
OCCASIONAL FEELINGS OF UNSTEADINESS: 1

## 2024-02-29 ASSESSMENT — PAIN - FUNCTIONAL ASSESSMENT: PAIN_FUNCTIONAL_ASSESSMENT: 0-10

## 2024-02-29 ASSESSMENT — PAIN DESCRIPTION - DESCRIPTORS: DESCRIPTORS: SHARP;SHOOTING;BURNING

## 2024-02-29 ASSESSMENT — PAIN SCALES - GENERAL: PAINLEVEL_OUTOF10: 7

## 2024-02-29 NOTE — PROGRESS NOTES
Subjective   Patient ID: Adam Dexter is a 43 y.o. male who presents for Foot Pain, Ankle Pain, and Knee Pain.  Is a 43-year-old male with cervicalgia history of ankle effusions bilateral knee pain ankle joint pain degenerative disc disease the presents today for follow-up.  Patient continues to smoke.  Patient had an emergency department visit where he he had ingested some garlic.  Patient continues to smoke about 8 to 10 cigarettes a day.  Patient does note that he is continually having issues with meeting some of his appointments for his fall for his right shoulder pain.  Patient does note that his father-in-law's amputation and health needs are being placed of head of the patient's.        Review of Systems   Constitutional:  Negative for activity change, chills, fatigue, fever and unexpected weight change.   HENT:  Negative for ear pain and voice change.    Eyes:  Negative for visual disturbance.   Respiratory:  Negative for cough, shortness of breath and wheezing.    Cardiovascular:  Negative for chest pain and palpitations.   Gastrointestinal:  Negative for diarrhea, nausea and vomiting.   Musculoskeletal:  Positive for arthralgias, gait problem and neck pain.   Psychiatric/Behavioral:  Negative for behavioral problems, self-injury, sleep disturbance and suicidal ideas.        Objective   Physical Exam  Vitals reviewed.   Constitutional:       Appearance: Normal appearance.   HENT:      Head: Normocephalic and atraumatic.      Mouth/Throat:      Mouth: Mucous membranes are moist.   Neck:      Vascular: No JVD.   Pulmonary:      Effort: Pulmonary effort is normal. No tachypnea or bradypnea.   Abdominal:      Palpations: Abdomen is soft.   Musculoskeletal:      Right ankle: Tenderness present. Decreased range of motion.      Left ankle: Tenderness present. Decreased range of motion.      Comments: Antalgic gait noted on exam. Medial tenderness of the joint line of the bilateral knees.  Gross strength in  bilateral lower extremities sensation grossly intact as well   Skin:     General: Skin is warm and dry.   Neurological:      Mental Status: He is alert and oriented to person, place, and time.   Psychiatric:         Mood and Affect: Mood normal.         Behavior: Behavior normal. Behavior is cooperative.       Assessment/Plan   Problem List Items Addressed This Visit             ICD-10-CM    History of ankle fusion Z98.1    Relevant Medications    oxyCODONE-acetaminophen (Percocet) 7.5-325 mg tablet    oxyCODONE-acetaminophen (Percocet) 7.5-325 mg tablet    gabapentin (Neurontin) 800 mg tablet    Cervical radiculopathy M54.12    Relevant Medications    oxyCODONE-acetaminophen (Percocet) 7.5-325 mg tablet    oxyCODONE-acetaminophen (Percocet) 7.5-325 mg tablet    gabapentin (Neurontin) 800 mg tablet    Bilateral knee pain M25.561, M25.562    Relevant Medications    gabapentin (Neurontin) 800 mg tablet    Ankle joint pain M25.579    Relevant Medications    oxyCODONE-acetaminophen (Percocet) 7.5-325 mg tablet    oxyCODONE-acetaminophen (Percocet) 7.5-325 mg tablet    Current every day smoker F17.200    Cervical spondylosis M47.812    Relevant Medications    oxyCODONE-acetaminophen (Percocet) 7.5-325 mg tablet    oxyCODONE-acetaminophen (Percocet) 7.5-325 mg tablet    DDD (degenerative disc disease), cervical - Primary M50.30    Relevant Medications    oxyCODONE-acetaminophen (Percocet) 7.5-325 mg tablet    oxyCODONE-acetaminophen (Percocet) 7.5-325 mg tablet     Other Visit Diagnoses         Codes    Fall, initial encounter     W19.XXXA    Acute pain of right shoulder     M25.511    Radiculopathy, cervical     M54.12    Relevant Medications    gabapentin (Neurontin) 800 mg tablet          I had nice discussion with the patient today our plan will be as follows.      Radiology: [ none at this time ]      Physically:  [ continue modification of activities, healthy lifestyle choice ]      Psychologically:  [ No acute  psychological concerns ]      Medication: [I will refill the medications at the same dose and frequency. We will continue to monitor the patient bimonthly for compliance, adverse reaction or interations The patient continues to see benefit and improvement in their quality of life and ability to maintain ADLs. Patient educated about the risks of taking opioids and operating a motor vehicle. Patient reports no adverse side effects to current medication regimen. Current regimen does allow patient to maintain ADLs. Oarrs has been reviewed. No suspicion of diversion or abuse. Compliance with medication regime, no use of illicit drugs, no sharing of narcotic medications with others, do not use others narcotic medication, and to avoid alcohol use. Patient has been educated on the risks, benefits, and alternatives of controlled substances as well as the proper way to store these medications.   The patient and I discussed the nature of this medication and its side effects. We discussed tolerance, physical dependence, psychological dependence, addiction and opioid-induced hyperalgesia ]      Duration:  [ 2 months ]      Intervention:  [ none at this time. Patient is stable.  Patient should follow-up with orthopedics.  Continue to quit smoking]         Adam Herrera PA-C 02/29/24 9:02 AM

## 2024-02-29 NOTE — PROGRESS NOTES
MEDICATION NAME: percocet  STRENGTH: 7.5 mg   LAST FILL DATE: 2024  DATE LAST TAKEN: 2024  QUANTITY FILLED: 90  QUANTITY REMAININ  COUNT COMPLETED BY: MARLENA CERDA and JAKOB Tucker    CONTROLLED SUBSTANCES AGREEMENT LAST SIGNED: 2023  ORT LAST COMPLETED:10/2023  Modified [employer] disability form filled out annually.    [id number]

## 2024-04-25 ENCOUNTER — OFFICE VISIT (OUTPATIENT)
Dept: PAIN MEDICINE | Facility: CLINIC | Age: 44
End: 2024-04-25
Payer: MEDICARE

## 2024-04-25 VITALS
SYSTOLIC BLOOD PRESSURE: 130 MMHG | BODY MASS INDEX: 31.41 KG/M2 | OXYGEN SATURATION: 96 % | WEIGHT: 244.71 LBS | HEIGHT: 74 IN | HEART RATE: 78 BPM | DIASTOLIC BLOOD PRESSURE: 76 MMHG

## 2024-04-25 DIAGNOSIS — M25.511 ACUTE PAIN OF RIGHT SHOULDER: ICD-10-CM

## 2024-04-25 DIAGNOSIS — M25.571 ARTHRALGIA OF BOTH ANKLES: ICD-10-CM

## 2024-04-25 DIAGNOSIS — Z98.1 HISTORY OF ANKLE FUSION: ICD-10-CM

## 2024-04-25 DIAGNOSIS — M54.12 CERVICAL RADICULOPATHY: ICD-10-CM

## 2024-04-25 DIAGNOSIS — M47.812 CERVICAL SPONDYLOSIS: ICD-10-CM
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
## 2024-04-25 DIAGNOSIS — M50.30 DDD (DEGENERATIVE DISC DISEASE), CERVICAL: ICD-10-CM

## 2024-04-25 DIAGNOSIS — M25.572 ARTHRALGIA OF BOTH ANKLES: ICD-10-CM

## 2024-04-25 PROCEDURE — 3078F DIAST BP <80 MM HG: CPT | Performed by: PHYSICIAN ASSISTANT

## 2024-04-25 PROCEDURE — 3075F SYST BP GE 130 - 139MM HG: CPT | Performed by: PHYSICIAN ASSISTANT

## 2024-04-25 PROCEDURE — 4004F PT TOBACCO SCREEN RCVD TLK: CPT | Performed by: PHYSICIAN ASSISTANT

## 2024-04-25 PROCEDURE — 99214 OFFICE O/P EST MOD 30 MIN: CPT | Performed by: PHYSICIAN ASSISTANT

## 2024-04-25 RX ORDER — OXYCODONE AND ACETAMINOPHEN 7.5; 325 MG/1; MG/1
1 TABLET ORAL EVERY 8 HOURS PRN
Qty: 90 TABLET | Refills: 0 | Status: SHIPPED | OUTPATIENT
Start: 2024-04-25 | End: 2024-05-25

## 2024-04-25 ASSESSMENT — PAIN SCALES - GENERAL: PAINLEVEL_OUTOF10: 8

## 2024-04-25 ASSESSMENT — ENCOUNTER SYMPTOMS
PALPITATIONS: 0
VOICE CHANGE: 0
NECK PAIN: 1
UNEXPECTED WEIGHT CHANGE: 0
COUGH: 0
DIARRHEA: 0
DEPRESSION: 0
NAUSEA: 0
OCCASIONAL FEELINGS OF UNSTEADINESS: 1
FATIGUE: 0
CHILLS: 0
ACTIVITY CHANGE: 0
ARTHRALGIAS: 1
SLEEP DISTURBANCE: 0
SHORTNESS OF BREATH: 0
WHEEZING: 0
LOSS OF SENSATION IN FEET: 1
FEVER: 0
VOMITING: 0

## 2024-04-25 ASSESSMENT — PAIN DESCRIPTION - DESCRIPTORS: DESCRIPTORS: DULL;ACHING;SHARP;STABBING

## 2024-04-25 ASSESSMENT — PAIN - FUNCTIONAL ASSESSMENT: PAIN_FUNCTIONAL_ASSESSMENT: 0-10

## 2024-04-25 NOTE — PROGRESS NOTES
MEDICATION NAME: percocet   STRENGTH: 7.5 mg  LAST FILL DATE: 4/10/2024  DATE LAST TAKEN: 2024  QUANTITY FILLED: 90  QUANTITY REMAININ  COUNT COMPLETED BY: MARLENA CERDA and JAKOB Tucker    CONTROLLED SUBSTANCES AGREEMENT LAST SIGNED: 2023  ORT LAST COMPLETED:10/2023  Modified [employer] disability form filled out annually.

## 2024-04-25 NOTE — PROGRESS NOTES
Subjective   Patient ID: Adam Dexter is a 44 y.o. male who presents for  foot, ankle and knee.  Patient is a 44-year-old male with history of multiple ankle surgeries and continued pain patient continues to have neck and radiating symptoms intermittently.  He is struggling with the delay in care related to his surgical procedure and the insurance company is denying it.  This is causing frustration but patient is hopeful that they will be able to get resolution to this soon.  They do have an appointment phone call with the insurance company tomorrow.  Patient continues to utilize his medications that do seem to provide slightly better coverage of pain with the increased amount milligrams .        Review of Systems   Constitutional:  Negative for activity change, chills, fatigue, fever and unexpected weight change.   HENT:  Negative for ear pain and voice change.    Eyes:  Negative for visual disturbance.   Respiratory:  Negative for cough, shortness of breath and wheezing.    Cardiovascular:  Negative for chest pain and palpitations.   Gastrointestinal:  Negative for diarrhea, nausea and vomiting.   Musculoskeletal:  Positive for arthralgias, gait problem and neck pain.   Psychiatric/Behavioral:  Negative for behavioral problems, self-injury, sleep disturbance and suicidal ideas.        Objective   Physical Exam  Vitals reviewed.   Constitutional:       Appearance: Normal appearance.   HENT:      Head: Normocephalic and atraumatic.      Mouth/Throat:      Mouth: Mucous membranes are moist.   Neck:      Vascular: No JVD.   Pulmonary:      Effort: Pulmonary effort is normal. No tachypnea or bradypnea.   Abdominal:      Palpations: Abdomen is soft.   Musculoskeletal:      Right ankle: Tenderness present. Decreased range of motion.      Left ankle: Tenderness present. Decreased range of motion.      Comments: Antalgic gait noted on exam. Medial tenderness of the joint line of the bilateral knees.  Gross strength in  bilateral lower extremities sensation grossly intact as well   Skin:     General: Skin is warm and dry.   Neurological:      Mental Status: He is alert and oriented to person, place, and time.   Psychiatric:         Mood and Affect: Mood normal.         Behavior: Behavior normal. Behavior is cooperative.       Assessment/Plan   Problem List Items Addressed This Visit             ICD-10-CM    History of ankle fusion Z98.1    Cervical radiculopathy M54.12    Ankle joint pain M25.579    Cervical spondylosis M47.812    DDD (degenerative disc disease), cervical M50.30     Other Visit Diagnoses         Codes    Fall, initial encounter     W19.XXXA    Acute pain of right shoulder     M25.511        I had nice discussion with the patient today our plan will be as follows.      Radiology: [ none at this time ]      Physically:  [ continue modification of activities, healthy lifestyle choice ]      Psychologically:  [ No acute psychological concerns ]      Medication: [I will refill the medications at the same dose and frequency. We will continue to monitor the patient bimonthly for compliance, adverse reaction or interations The patient continues to see benefit and improvement in their quality of life and ability to maintain ADLs. Patient educated about the risks of taking opioids and operating a motor vehicle. Patient reports no adverse side effects to current medication regimen. Current regimen does allow patient to maintain ADLs. Oarrs has been reviewed. No suspicion of diversion or abuse. Compliance with medication regime, no use of illicit drugs, no sharing of narcotic medications with others, do not use others narcotic medication, and to avoid alcohol use. Patient has been educated on the risks, benefits, and alternatives of controlled substances as well as the proper way to store these medications.   The patient and I discussed the nature of this medication and its side effects. We discussed tolerance, physical  dependence, psychological dependence, addiction and opioid-induced hyperalgesia ]      Duration:  [ 2 months ]      Intervention:  [ delays in care due to insurance refusing coverage for the next left Sx.   ]           Adam Herrera PA-C 04/25/24 9:14 AM

## 2024-06-19 DIAGNOSIS — M54.12 RADICULOPATHY, CERVICAL: ICD-10-CM

## 2024-06-19 DIAGNOSIS — M54.12 CERVICAL RADICULOPATHY: ICD-10-CM

## 2024-06-19 DIAGNOSIS — M25.561 CHRONIC PAIN OF BOTH KNEES: ICD-10-CM

## 2024-06-19 DIAGNOSIS — G89.29 CHRONIC PAIN OF BOTH KNEES: ICD-10-CM

## 2024-06-19 DIAGNOSIS — M25.562 CHRONIC PAIN OF BOTH KNEES: ICD-10-CM

## 2024-06-19 DIAGNOSIS — Z98.1 HISTORY OF ANKLE FUSION: ICD-10-CM

## 2024-06-19 RX ORDER — GABAPENTIN 800 MG/1
800 TABLET ORAL 3 TIMES DAILY
Qty: 90 TABLET | Refills: 2 | Status: SHIPPED | OUTPATIENT
Start: 2024-06-19

## 2024-06-28 ENCOUNTER — OFFICE VISIT (OUTPATIENT)
Dept: PAIN MEDICINE | Facility: CLINIC | Age: 44
End: 2024-06-28
Payer: MEDICARE

## 2024-06-28 VITALS
BODY MASS INDEX: 31.32 KG/M2 | OXYGEN SATURATION: 96 % | HEIGHT: 74 IN | WEIGHT: 244 LBS | HEART RATE: 82 BPM | DIASTOLIC BLOOD PRESSURE: 80 MMHG | SYSTOLIC BLOOD PRESSURE: 122 MMHG | RESPIRATION RATE: 18 BRPM

## 2024-06-28 DIAGNOSIS — M54.12 CERVICAL RADICULOPATHY: ICD-10-CM

## 2024-06-28 DIAGNOSIS — M25.572 ARTHRALGIA OF BOTH ANKLES: ICD-10-CM

## 2024-06-28 DIAGNOSIS — M47.812 CERVICAL SPONDYLOSIS: ICD-10-CM

## 2024-06-28 DIAGNOSIS — M54.12 RADICULOPATHY, CERVICAL: ICD-10-CM

## 2024-06-28 DIAGNOSIS — M25.571 ARTHRALGIA OF BOTH ANKLES: ICD-10-CM
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
## 2024-06-28 DIAGNOSIS — M50.30 DDD (DEGENERATIVE DISC DISEASE), CERVICAL: ICD-10-CM

## 2024-06-28 DIAGNOSIS — M25.511 ACUTE PAIN OF RIGHT SHOULDER: ICD-10-CM

## 2024-06-28 DIAGNOSIS — Z98.1 HISTORY OF ANKLE FUSION: ICD-10-CM

## 2024-06-28 DIAGNOSIS — M25.562 CHRONIC PAIN OF BOTH KNEES: ICD-10-CM

## 2024-06-28 DIAGNOSIS — M25.561 CHRONIC PAIN OF BOTH KNEES: ICD-10-CM

## 2024-06-28 DIAGNOSIS — G89.29 CHRONIC PAIN OF BOTH KNEES: ICD-10-CM

## 2024-06-28 PROCEDURE — 99214 OFFICE O/P EST MOD 30 MIN: CPT | Performed by: PHYSICIAN ASSISTANT

## 2024-06-28 RX ORDER — GABAPENTIN 800 MG/1
800 TABLET ORAL 3 TIMES DAILY
Qty: 90 TABLET | Refills: 2 | Status: SHIPPED | OUTPATIENT
Start: 2024-06-28

## 2024-06-28 RX ORDER — OXYCODONE AND ACETAMINOPHEN 7.5; 325 MG/1; MG/1
1 TABLET ORAL EVERY 8 HOURS PRN
Qty: 90 TABLET | Refills: 0 | Status: SHIPPED | OUTPATIENT
Start: 2024-06-28 | End: 2024-07-28

## 2024-06-28 ASSESSMENT — ENCOUNTER SYMPTOMS
SHORTNESS OF BREATH: 0
COUGH: 0
PAIN: 1
FEVER: 0
PALPITATIONS: 0
CHILLS: 0
DIARRHEA: 0
VOICE CHANGE: 0
SLEEP DISTURBANCE: 0
ARTHRALGIAS: 1
NAUSEA: 0
VOMITING: 0
FATIGUE: 0
WHEEZING: 0
UNEXPECTED WEIGHT CHANGE: 0
ACTIVITY CHANGE: 0
NECK PAIN: 1

## 2024-06-28 ASSESSMENT — PAIN SCALES - GENERAL
PAINLEVEL: 8
PAINLEVEL_OUTOF10: 8

## 2024-06-28 ASSESSMENT — PAIN - FUNCTIONAL ASSESSMENT: PAIN_FUNCTIONAL_ASSESSMENT: 0-10

## 2024-06-28 ASSESSMENT — LIFESTYLE VARIABLES
HOW MANY STANDARD DRINKS CONTAINING ALCOHOL DO YOU HAVE ON A TYPICAL DAY: PATIENT DOES NOT DRINK
HOW OFTEN DO YOU HAVE SIX OR MORE DRINKS ON ONE OCCASION: NEVER
HOW OFTEN DO YOU HAVE A DRINK CONTAINING ALCOHOL: NEVER
SKIP TO QUESTIONS 9-10: 1
AUDIT-C TOTAL SCORE: 0

## 2024-06-28 ASSESSMENT — PATIENT HEALTH QUESTIONNAIRE - PHQ9
1. LITTLE INTEREST OR PLEASURE IN DOING THINGS: NOT AT ALL
2. FEELING DOWN, DEPRESSED OR HOPELESS: NOT AT ALL
SUM OF ALL RESPONSES TO PHQ9 QUESTIONS 1 & 2: 0

## 2024-06-28 NOTE — PROGRESS NOTES
MEDICATION NAME: Oxycodone   STRENGTH: 7.5-325  LAST FILL DATE: 24  DATE LAST TAKEN: 24  QUANTITY FILLED: 90  QUANTITY REMAININ  COUNT COMPLETED BY: JOSE Eller      UDS LAST COMPLETED:   CONTROLLED SUBSTANCES AGREEMENT LAST SIGNED:   ORT LAST COMPLETED:  Modified [employer] disability form filled out annually.

## 2024-06-28 NOTE — PROGRESS NOTES
Subjective   Patient ID: Adam Dexter is a 44 y.o. male who presents for Pain.  Patient is a 44-year-old male with history of ankle fusion cervical radiculopathy ankle joint pain cervical spondylosis the presents today for follow-up.  Patient was about to have his surgery but his wife is now injured her right knee and unable to ambulate. It is likely that she will have to have an acute surgery.  This is unfortunate as he will not have her tibial the provide the ADL and postoperative care that he will require postsurgery.  This is delayed his case.    Pain  Pertinent negatives include no chest pain, diarrhea, fatigue, fever, nausea, shortness of breath, vomiting or wheezing.       Review of Systems   Constitutional:  Negative for activity change, chills, fatigue, fever and unexpected weight change.   HENT:  Negative for ear pain and voice change.    Eyes:  Negative for visual disturbance.   Respiratory:  Negative for cough, shortness of breath and wheezing.    Cardiovascular:  Negative for chest pain and palpitations.   Gastrointestinal:  Negative for diarrhea, nausea and vomiting.   Musculoskeletal:  Positive for arthralgias, gait problem and neck pain.   Psychiatric/Behavioral:  Negative for behavioral problems, self-injury, sleep disturbance and suicidal ideas.        Objective   Physical Exam  Vitals reviewed.   Constitutional:       Appearance: Normal appearance.   HENT:      Head: Normocephalic and atraumatic.      Mouth/Throat:      Mouth: Mucous membranes are moist.   Neck:      Vascular: No JVD.   Pulmonary:      Effort: Pulmonary effort is normal. No tachypnea or bradypnea.   Abdominal:      Palpations: Abdomen is soft.   Musculoskeletal:      Right ankle: Tenderness present. Decreased range of motion.      Left ankle: Tenderness present. Decreased range of motion.      Comments: Antalgic gait noted on exam. Medial tenderness of the joint line of the bilateral knees.  Gross strength in bilateral lower  extremities sensation grossly intact as well   Skin:     General: Skin is warm and dry.   Neurological:      Mental Status: He is alert and oriented to person, place, and time.   Psychiatric:         Mood and Affect: Mood normal.         Behavior: Behavior normal. Behavior is cooperative.       Assessment/Plan   Problem List Items Addressed This Visit             ICD-10-CM    History of ankle fusion Z98.1    Cervical radiculopathy M54.12    Ankle joint pain M25.579    Cervical spondylosis M47.812    DDD (degenerative disc disease), cervical M50.30     Other Visit Diagnoses         Codes    Fall, initial encounter     W19.XXXA    Acute pain of right shoulder     M25.511        I had nice discussion with the patient today our plan will be as follows.      Radiology: [ none at this time ]      Physically:  [ continue modification of activities, healthy lifestyle choice ]      Psychologically:  [ No acute psychological concerns ]      Medication: [I will refill the medications at the same dose and frequency. We will continue to monitor the patient bimonthly for compliance, adverse reaction or interactions The patient continues to see benefit and improvement in their quality of life and ability to maintain ADLs. Patient educated about the risks of taking opioids and operating a motor vehicle. Patient reports no adverse side effects to current medication regimen. Current regimen does allow patient to maintain ADLs. Oarrs has been reviewed. No suspicion of diversion or abuse. Compliance with medication regime, no use of illicit drugs, no sharing of narcotic medications with others, do not use others narcotic medication, and to avoid alcohol use. Patient has been educated on the risks, benefits, and alternatives of controlled substances as well as the proper way to store these medications.   The patient and I discussed the nature of this medication and its side effects. We discussed tolerance, physical dependence,  psychological dependence, addiction and opioid-induced hyperalgesia ]      Duration:  [ 2 months ]      Intervention:  [Continue to be conscious continue to modify activities to reduce exacerbations.  Keep us informed when her surgical case will be rescheduled]           Adam Herrera PA-C 06/28/24 11:13 AM

## 2024-08-29 ENCOUNTER — OFFICE VISIT (OUTPATIENT)
Dept: PAIN MEDICINE | Facility: CLINIC | Age: 44
End: 2024-08-29
Payer: MEDICARE

## 2024-08-29 VITALS
BODY MASS INDEX: 31.32 KG/M2 | RESPIRATION RATE: 18 BRPM | HEIGHT: 74 IN | HEART RATE: 80 BPM | DIASTOLIC BLOOD PRESSURE: 80 MMHG | SYSTOLIC BLOOD PRESSURE: 134 MMHG | OXYGEN SATURATION: 96 % | WEIGHT: 244 LBS

## 2024-08-29 DIAGNOSIS — M50.30 DDD (DEGENERATIVE DISC DISEASE), CERVICAL: ICD-10-CM

## 2024-08-29 DIAGNOSIS — M25.562 CHRONIC PAIN OF BOTH KNEES: ICD-10-CM

## 2024-08-29 DIAGNOSIS — Z98.1 HISTORY OF ANKLE FUSION: ICD-10-CM

## 2024-08-29 DIAGNOSIS — G89.29 CHRONIC PAIN OF BOTH KNEES: ICD-10-CM

## 2024-08-29 DIAGNOSIS — M47.812 CERVICAL SPONDYLOSIS: ICD-10-CM
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
## 2024-08-29 DIAGNOSIS — M25.571 ARTHRALGIA OF BOTH ANKLES: ICD-10-CM

## 2024-08-29 DIAGNOSIS — M25.572 ARTHRALGIA OF BOTH ANKLES: ICD-10-CM

## 2024-08-29 DIAGNOSIS — M54.12 RADICULOPATHY, CERVICAL: ICD-10-CM

## 2024-08-29 DIAGNOSIS — M54.12 CERVICAL RADICULOPATHY: ICD-10-CM

## 2024-08-29 DIAGNOSIS — Z79.899 HISTORY OF ONGOING TREATMENT WITH HIGH-RISK MEDICATION: Primary | ICD-10-CM

## 2024-08-29 DIAGNOSIS — M25.561 CHRONIC PAIN OF BOTH KNEES: ICD-10-CM

## 2024-08-29 DIAGNOSIS — M25.511 ACUTE PAIN OF RIGHT SHOULDER: ICD-10-CM

## 2024-08-29 PROCEDURE — 99214 OFFICE O/P EST MOD 30 MIN: CPT | Performed by: PHYSICIAN ASSISTANT

## 2024-08-29 PROCEDURE — 3079F DIAST BP 80-89 MM HG: CPT | Performed by: PHYSICIAN ASSISTANT

## 2024-08-29 PROCEDURE — 82570 ASSAY OF URINE CREATININE: CPT | Mod: 59 | Performed by: PHYSICIAN ASSISTANT

## 2024-08-29 PROCEDURE — 80365 DRUG SCREENING OXYCODONE: CPT | Performed by: PHYSICIAN ASSISTANT

## 2024-08-29 PROCEDURE — 3008F BODY MASS INDEX DOCD: CPT | Performed by: PHYSICIAN ASSISTANT

## 2024-08-29 PROCEDURE — 3075F SYST BP GE 130 - 139MM HG: CPT | Performed by: PHYSICIAN ASSISTANT

## 2024-08-29 PROCEDURE — 4004F PT TOBACCO SCREEN RCVD TLK: CPT | Performed by: PHYSICIAN ASSISTANT

## 2024-08-29 RX ORDER — GABAPENTIN 800 MG/1
800 TABLET ORAL 3 TIMES DAILY
Qty: 90 TABLET | Refills: 2 | Status: SHIPPED | OUTPATIENT
Start: 2024-08-29

## 2024-08-29 RX ORDER — OXYCODONE AND ACETAMINOPHEN 7.5; 325 MG/1; MG/1
1 TABLET ORAL EVERY 8 HOURS PRN
Qty: 90 TABLET | Refills: 0 | Status: SHIPPED | OUTPATIENT
Start: 2024-08-29 | End: 2024-09-28

## 2024-08-29 ASSESSMENT — ENCOUNTER SYMPTOMS
UNEXPECTED WEIGHT CHANGE: 0
FEVER: 0
PAIN: 1
ACTIVITY CHANGE: 0
SLEEP DISTURBANCE: 0
FATIGUE: 0
COUGH: 0
NECK PAIN: 1
CHILLS: 0
SHORTNESS OF BREATH: 0
VOMITING: 0
NAUSEA: 0
WHEEZING: 0
PALPITATIONS: 0
ARTHRALGIAS: 1
DIARRHEA: 0
VOICE CHANGE: 0

## 2024-08-29 ASSESSMENT — LIFESTYLE VARIABLES
HOW OFTEN DO YOU HAVE A DRINK CONTAINING ALCOHOL: NEVER
SKIP TO QUESTIONS 9-10: 1
HOW MANY STANDARD DRINKS CONTAINING ALCOHOL DO YOU HAVE ON A TYPICAL DAY: PATIENT DOES NOT DRINK
AUDIT-C TOTAL SCORE: 0
HOW OFTEN DO YOU HAVE SIX OR MORE DRINKS ON ONE OCCASION: NEVER

## 2024-08-29 ASSESSMENT — PAIN - FUNCTIONAL ASSESSMENT: PAIN_FUNCTIONAL_ASSESSMENT: 0-10

## 2024-08-29 ASSESSMENT — PAIN SCALES - GENERAL
PAINLEVEL_OUTOF10: 7
PAINLEVEL: 7

## 2024-08-29 ASSESSMENT — PATIENT HEALTH QUESTIONNAIRE - PHQ9
SUM OF ALL RESPONSES TO PHQ9 QUESTIONS 1 & 2: 0
2. FEELING DOWN, DEPRESSED OR HOPELESS: NOT AT ALL
1. LITTLE INTEREST OR PLEASURE IN DOING THINGS: NOT AT ALL

## 2024-08-29 NOTE — PROGRESS NOTES
MEDICATION NAME: Oxycodone   STRENGTH: 7.5-325  LAST FILL DATE: 24  DATE LAST TAKEN: 24  QUANTITY FILLED: 90  QUANTITY REMAININ  COUNT COMPLETED BY: MARLENA CERDA and JOSE MEDINA      UDS LAST COMPLETED:   CONTROLLED SUBSTANCES AGREEMENT LAST SIGNED:   ORT LAST COMPLETED:  Modified [employer] disability form filled out annually.

## 2024-08-29 NOTE — PROGRESS NOTES
Subjective   Patient ID: Adam Dexter is a 44 y.o. male who presents for Pain.  Patient is a 44-year-old male with history of ankle fusions with continued pain cervical radiculopathy knee pain spondylosis and degenerative disc disease the presents today for follow-up.  Patient denotes that his surgery is still being delayed and there is no confirmation on the plan moving forward at this time.  Patient denotes that he is having difficulty finding an reliable care partner as his family members are requiring other medical care at this time.  Patient denies any specific injuries trauma overuse type activities to increase his pain.  He denies adverse reactions to the utilization of his medications    Pain  Pertinent negatives include no chest pain, diarrhea, fatigue, fever, nausea, shortness of breath, vomiting or wheezing.       Review of Systems   Constitutional:  Negative for activity change, chills, fatigue, fever and unexpected weight change.   HENT:  Negative for ear pain and voice change.    Eyes:  Negative for visual disturbance.   Respiratory:  Negative for cough, shortness of breath and wheezing.    Cardiovascular:  Negative for chest pain and palpitations.   Gastrointestinal:  Negative for diarrhea, nausea and vomiting.   Musculoskeletal:  Positive for arthralgias, gait problem and neck pain.   Psychiatric/Behavioral:  Negative for behavioral problems, self-injury, sleep disturbance and suicidal ideas.        Objective   Physical Exam  Vitals reviewed.   Constitutional:       Appearance: Normal appearance.   HENT:      Head: Normocephalic and atraumatic.      Mouth/Throat:      Mouth: Mucous membranes are moist.   Neck:      Vascular: No JVD.   Pulmonary:      Effort: Pulmonary effort is normal. No tachypnea or bradypnea.   Abdominal:      Palpations: Abdomen is soft.   Musculoskeletal:      Right ankle: Tenderness present. Decreased range of motion.      Left ankle: Tenderness present. Decreased range of  motion.      Comments: Antalgic gait noted on exam. Medial tenderness of the joint line of the bilateral knees.  Gross strength in bilateral lower extremities sensation grossly intact as well   Skin:     General: Skin is warm and dry.   Neurological:      Mental Status: He is alert and oriented to person, place, and time.   Psychiatric:         Mood and Affect: Mood normal.         Behavior: Behavior normal. Behavior is cooperative.         Assessment/Plan   Problem List Items Addressed This Visit             ICD-10-CM    History of ankle fusion Z98.1    Relevant Medications    oxyCODONE-acetaminophen (Percocet) 7.5-325 mg tablet    oxyCODONE-acetaminophen (Percocet) 7.5-325 mg tablet    gabapentin (Neurontin) 800 mg tablet    Cervical radiculopathy M54.12    Relevant Medications    oxyCODONE-acetaminophen (Percocet) 7.5-325 mg tablet    oxyCODONE-acetaminophen (Percocet) 7.5-325 mg tablet    gabapentin (Neurontin) 800 mg tablet    Ankle joint pain M25.579    Relevant Medications    oxyCODONE-acetaminophen (Percocet) 7.5-325 mg tablet    oxyCODONE-acetaminophen (Percocet) 7.5-325 mg tablet    Knee pain M25.569    Relevant Medications    gabapentin (Neurontin) 800 mg tablet    Cervical spondylosis M47.812    Relevant Medications    oxyCODONE-acetaminophen (Percocet) 7.5-325 mg tablet    oxyCODONE-acetaminophen (Percocet) 7.5-325 mg tablet    DDD (degenerative disc disease), cervical M50.30    Relevant Medications    oxyCODONE-acetaminophen (Percocet) 7.5-325 mg tablet    oxyCODONE-acetaminophen (Percocet) 7.5-325 mg tablet     Other Visit Diagnoses         Codes    History of ongoing treatment with high-risk medication    -  Primary Z79.899    Relevant Orders    Opiate/Opioid/Benzo Prescription Compliance    OOB Internal Tracking    Fall, initial encounter     W19.XXXA    Relevant Medications    oxyCODONE-acetaminophen (Percocet) 7.5-325 mg tablet    Acute pain of right shoulder     M25.511    Relevant Medications     oxyCODONE-acetaminophen (Percocet) 7.5-325 mg tablet    Radiculopathy, cervical     M54.12    Relevant Medications    gabapentin (Neurontin) 800 mg tablet        I had nice discussion with the patient today our plan will be as follows.      Radiology: [ none at this time ]      Physically:  [ continue modification of activities, healthy lifestyle choice ]      Psychologically:  [ No acute psychological concerns. There are no mental health issues of which I am aware that are contributing to the patient's pain. There are no substance abuse or alcohol abuse issues of which I am aware that are contributing to the patient's pain. ]      Medication: [I will refill the medications at the same dose and frequency. We will continue to monitor the patient bimonthly for compliance, adverse reaction or interactions The patient continues to see benefit and improvement in their quality of life and ability to maintain ADLs. Patient educated about the risks of taking opioids and operating a motor vehicle. Patient reports no adverse side effects to current medication regimen. Current regimen does allow patient to maintain ADLs. Oarrs has been reviewed. No suspicion of diversion or abuse. Compliance with medication regime, no use of illicit drugs, no sharing of narcotic medications with others, do not use others narcotic medication, and to avoid alcohol use. Patient has been educated on the risks, benefits, and alternatives of controlled substances as well as the proper way to store these medications.   The patient and I discussed the nature of this medication and its side effects. We discussed tolerance, physical dependence, psychological dependence, addiction and opioid-induced hyperalgesia   Pt to submit sample for tox screen]      Duration:  [ 2 months ]      Intervention:  [ none at this time. Patient is stable.  ]           Adam Herrera PA-C 08/29/24 12:54 PM

## 2024-08-30 LAB
AMPHETAMINES UR QL SCN: NORMAL
BARBITURATES UR QL SCN: NORMAL
BZE UR QL SCN: NORMAL
CANNABINOIDS UR QL SCN: NORMAL
CREAT UR-MCNC: 49.5 MG/DL (ref 20–370)
PCP UR QL SCN: NORMAL

## 2024-09-04 LAB
1OH-MIDAZOLAM UR CFM-MCNC: <25 NG/ML
6MAM UR CFM-MCNC: <25 NG/ML
7AMINOCLONAZEPAM UR CFM-MCNC: <25 NG/ML
A-OH ALPRAZ UR CFM-MCNC: <25 NG/ML
ALPRAZ UR CFM-MCNC: <25 NG/ML
CHLORDIAZEP UR CFM-MCNC: <25 NG/ML
CLONAZEPAM UR CFM-MCNC: <25 NG/ML
CODEINE UR CFM-MCNC: <50 NG/ML
DIAZEPAM UR CFM-MCNC: <25 NG/ML
EDDP UR CFM-MCNC: <25 NG/ML
FENTANYL UR CFM-MCNC: <2.5 NG/ML
HYDROCODONE CTO UR CFM-MCNC: <25 NG/ML
HYDROMORPHONE UR CFM-MCNC: <25 NG/ML
LORAZEPAM UR CFM-MCNC: <25 NG/ML
METHADONE UR CFM-MCNC: <25 NG/ML
MIDAZOLAM UR CFM-MCNC: <25 NG/ML
MORPHINE UR CFM-MCNC: <50 NG/ML
NORDIAZEPAM UR CFM-MCNC: <25 NG/ML
NORFENTANYL UR CFM-MCNC: <2.5 NG/ML
NORHYDROCODONE UR CFM-MCNC: <25 NG/ML
NOROXYCODONE UR CFM-MCNC: >1000 NG/ML
NORTRAMADOL UR-MCNC: <50 NG/ML
OXAZEPAM UR CFM-MCNC: <25 NG/ML
OXYCODONE UR CFM-MCNC: 137 NG/ML
OXYMORPHONE UR CFM-MCNC: 145 NG/ML
TEMAZEPAM UR CFM-MCNC: <25 NG/ML
TRAMADOL UR CFM-MCNC: <50 NG/ML
ZOLPIDEM UR CFM-MCNC: <25 NG/ML
ZOLPIDEM UR-MCNC: <25 NG/ML

## 2024-10-24 ENCOUNTER — APPOINTMENT (OUTPATIENT)
Dept: PAIN MEDICINE | Facility: HOSPITAL | Age: 44
End: 2024-10-24
Payer: MEDICARE

## 2024-10-24 ENCOUNTER — OFFICE VISIT (OUTPATIENT)
Dept: PAIN MEDICINE | Facility: CLINIC | Age: 44
End: 2024-10-24
Payer: MEDICARE

## 2024-10-24 VITALS
RESPIRATION RATE: 18 BRPM | HEART RATE: 83 BPM | OXYGEN SATURATION: 97 % | WEIGHT: 244 LBS | DIASTOLIC BLOOD PRESSURE: 84 MMHG | HEIGHT: 74 IN | SYSTOLIC BLOOD PRESSURE: 111 MMHG | BODY MASS INDEX: 31.32 KG/M2

## 2024-10-24 DIAGNOSIS — M54.12 CERVICAL RADICULOPATHY: ICD-10-CM

## 2024-10-24 DIAGNOSIS — M50.30 DDD (DEGENERATIVE DISC DISEASE), CERVICAL: ICD-10-CM

## 2024-10-24 DIAGNOSIS — M25.511 ACUTE PAIN OF RIGHT SHOULDER: ICD-10-CM
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
## 2024-10-24 DIAGNOSIS — M25.571 ARTHRALGIA OF BOTH ANKLES: ICD-10-CM

## 2024-10-24 DIAGNOSIS — Z98.1 HISTORY OF ANKLE FUSION: ICD-10-CM

## 2024-10-24 DIAGNOSIS — M25.572 ARTHRALGIA OF BOTH ANKLES: ICD-10-CM

## 2024-10-24 DIAGNOSIS — M47.812 CERVICAL SPONDYLOSIS: ICD-10-CM

## 2024-10-24 DIAGNOSIS — M54.2 ACUTE NECK PAIN: Primary | ICD-10-CM

## 2024-10-24 PROCEDURE — 4004F PT TOBACCO SCREEN RCVD TLK: CPT | Performed by: PHYSICIAN ASSISTANT

## 2024-10-24 PROCEDURE — 99214 OFFICE O/P EST MOD 30 MIN: CPT | Performed by: PHYSICIAN ASSISTANT

## 2024-10-24 PROCEDURE — 3079F DIAST BP 80-89 MM HG: CPT | Performed by: PHYSICIAN ASSISTANT

## 2024-10-24 PROCEDURE — 3008F BODY MASS INDEX DOCD: CPT | Performed by: PHYSICIAN ASSISTANT

## 2024-10-24 PROCEDURE — 3074F SYST BP LT 130 MM HG: CPT | Performed by: PHYSICIAN ASSISTANT

## 2024-10-24 RX ORDER — OXYCODONE AND ACETAMINOPHEN 7.5; 325 MG/1; MG/1
1 TABLET ORAL EVERY 8 HOURS PRN
Qty: 90 TABLET | Refills: 0 | Status: SHIPPED | OUTPATIENT
Start: 2024-12-05 | End: 2025-01-04

## 2024-10-24 RX ORDER — OXYCODONE AND ACETAMINOPHEN 7.5; 325 MG/1; MG/1
1 TABLET ORAL EVERY 8 HOURS PRN
Qty: 90 TABLET | Refills: 0 | Status: SHIPPED | OUTPATIENT
Start: 2024-11-05 | End: 2024-12-05

## 2024-10-24 RX ORDER — PREDNISONE 20 MG/1
TABLET ORAL
Qty: 30 TABLET | Refills: 0 | Status: SHIPPED | OUTPATIENT
Start: 2024-10-24 | End: 2024-11-08

## 2024-10-24 ASSESSMENT — ENCOUNTER SYMPTOMS
DIARRHEA: 0
FEVER: 0
VOICE CHANGE: 0
FATIGUE: 0
COUGH: 0
NECK PAIN: 1
PALPITATIONS: 0
NAUSEA: 0
ACTIVITY CHANGE: 0
SHORTNESS OF BREATH: 0
VOMITING: 0
SLEEP DISTURBANCE: 0
CHILLS: 0
WHEEZING: 0
ARTHRALGIAS: 1
UNEXPECTED WEIGHT CHANGE: 0

## 2024-10-24 ASSESSMENT — LIFESTYLE VARIABLES
SKIP TO QUESTIONS 9-10: 1
HOW MANY STANDARD DRINKS CONTAINING ALCOHOL DO YOU HAVE ON A TYPICAL DAY: PATIENT DOES NOT DRINK
HOW OFTEN DO YOU HAVE SIX OR MORE DRINKS ON ONE OCCASION: NEVER
HOW OFTEN DO YOU HAVE A DRINK CONTAINING ALCOHOL: NEVER
AUDIT-C TOTAL SCORE: 0

## 2024-10-24 ASSESSMENT — PAIN SCALES - GENERAL
PAINLEVEL_OUTOF10: 9
PAINLEVEL_OUTOF10: 9

## 2024-10-24 ASSESSMENT — PAIN - FUNCTIONAL ASSESSMENT: PAIN_FUNCTIONAL_ASSESSMENT: 0-10

## 2024-10-24 NOTE — PROGRESS NOTES
MEDICATION NAME: Percocet   STRENGTH: 7.5-325  LAST FILL DATE: 10/6/24  DATE LAST TAKEN: 10/24/24  QUANTITY FILLED: 90  QUANTITY REMAININ  COUNT COMPLETED BY: MARLENA CERDA and JOSE MEDINA      UDS LAST COMPLETED:   CONTROLLED SUBSTANCES AGREEMENT LAST SIGNED:   ORT LAST COMPLETED:  Modified [employer] disability form filled out annually.

## 2024-10-24 NOTE — PROGRESS NOTES
Subjective   Patient ID: Adam Dexter is a 44 y.o. male who presents for Pain.  Patient is a 44-year-old male with a history of ankle fusion cervical radiculopathy ankle pain cervical spondylosis degenerative disc disease cervical the presents today for follow-up.  Patient had an acute exacerbation of an upper respiratory infection and went to the emergency department.  Not long after that he did have an event where he was pushing a relative in a wheelchair the wheelchair got stuck on something patient went forward and impacted.  He is now having acute on chronic cervical issues difficulty with range of motion in neck and his primary care is trying to get him into the surgeon consultation.  He has had prior epidural injections in 2011 that were ineffective for drastic control of his symptoms.  Patient continues to try to modify his activities he is requesting something for pain.  Patient is going to be having likely a new MRI and possible disc replacement surgery that has been needed for some time.  Patient is having an output his ankle surgery on hold due to these new acute injury and chronic issues        Review of Systems   Constitutional:  Negative for activity change, chills, fatigue, fever and unexpected weight change.   HENT:  Negative for ear pain and voice change.    Eyes:  Negative for visual disturbance.   Respiratory:  Negative for cough, shortness of breath and wheezing.    Cardiovascular:  Negative for chest pain and palpitations.   Gastrointestinal:  Negative for diarrhea, nausea and vomiting.   Musculoskeletal:  Positive for arthralgias, gait problem and neck pain.   Psychiatric/Behavioral:  Negative for behavioral problems, self-injury, sleep disturbance and suicidal ideas.        Objective   Physical Exam  Vitals reviewed.   Constitutional:       Appearance: Normal appearance.   HENT:      Head: Normocephalic and atraumatic.      Mouth/Throat:      Mouth: Mucous membranes are moist.   Neck:       Vascular: No JVD.   Pulmonary:      Effort: Pulmonary effort is normal. No tachypnea or bradypnea.   Abdominal:      Palpations: Abdomen is soft.   Musculoskeletal:      Right ankle: Tenderness present. Decreased range of motion.      Left ankle: Tenderness present. Decreased range of motion.      Comments: Antalgic gait noted on exam. Medial tenderness of the joint line of the bilateral knees.  Gross strength in bilateral lower extremities sensation grossly intact as well    Patient has severe trapezial tenderness muscle guarding and unwilling to move neck due to the pain.   Skin:     General: Skin is warm and dry.   Neurological:      Mental Status: He is alert and oriented to person, place, and time.   Psychiatric:         Mood and Affect: Mood normal.         Behavior: Behavior normal. Behavior is cooperative.         Assessment/Plan   Problem List Items Addressed This Visit             ICD-10-CM    History of ankle fusion Z98.1    Relevant Medications    oxyCODONE-acetaminophen (Percocet) 7.5-325 mg tablet (Start on 12/5/2024)    oxyCODONE-acetaminophen (Percocet) 7.5-325 mg tablet (Start on 11/5/2024)    Cervical radiculopathy M54.12    Relevant Medications    oxyCODONE-acetaminophen (Percocet) 7.5-325 mg tablet (Start on 12/5/2024)    oxyCODONE-acetaminophen (Percocet) 7.5-325 mg tablet (Start on 11/5/2024)    predniSONE (Deltasone) 20 mg tablet    Ankle joint pain M25.579    Relevant Medications    oxyCODONE-acetaminophen (Percocet) 7.5-325 mg tablet (Start on 12/5/2024)    oxyCODONE-acetaminophen (Percocet) 7.5-325 mg tablet (Start on 11/5/2024)    Cervical spondylosis M47.812    Relevant Medications    oxyCODONE-acetaminophen (Percocet) 7.5-325 mg tablet (Start on 12/5/2024)    oxyCODONE-acetaminophen (Percocet) 7.5-325 mg tablet (Start on 11/5/2024)    DDD (degenerative disc disease), cervical M50.30    Relevant Medications    oxyCODONE-acetaminophen (Percocet) 7.5-325 mg tablet (Start on 12/5/2024)     oxyCODONE-acetaminophen (Percocet) 7.5-325 mg tablet (Start on 11/5/2024)     Other Visit Diagnoses         Codes    Acute neck pain    -  Primary M54.2    Relevant Medications    predniSONE (Deltasone) 20 mg tablet    Fall, initial encounter     W19.XXXA    Relevant Medications    oxyCODONE-acetaminophen (Percocet) 7.5-325 mg tablet (Start on 12/5/2024)    Acute pain of right shoulder     M25.511    Relevant Medications    oxyCODONE-acetaminophen (Percocet) 7.5-325 mg tablet (Start on 12/5/2024)        I had nice discussion with the patient today our plan will be as follows.      Radiology: [ none at this time ]      Physically:  [ continue modification of activities, healthy lifestyle choice ]      Psychologically:  [ No acute psychological concerns. There are no mental health issues of which I am aware that are contributing to the patient's pain. There are no substance abuse or alcohol abuse issues of which I am aware that are contributing to the patient's pain. ]      Medication: [I will refill the medications at the same dose and frequency. We will continue to monitor the patient bimonthly for compliance, adverse reaction or interactions The patient continues to see benefit and improvement in their quality of life and ability to maintain ADLs. Patient educated about the risks of taking opioids and operating a motor vehicle. Patient reports no adverse side effects to current medication regimen. Current regimen does allow patient to maintain ADLs. [place] has been reviewed. No suspicion of diversion or abuse. Compliance with medication regime, no use of illicit drugs, no sharing of narcotic medications with others, do not use others narcotic medication, and to avoid alcohol use. Patient has been educated on the risks, benefits, and alternatives of controlled substances as well as the proper way to store these medications.   The patient and I discussed the nature of this medication and its side effects. We discussed  tolerance, physical dependence, psychological dependence, addiction and opioid-induced hyperalgesia     Provide patient with a prednisone taper hopefully this will allow acute symptoms to be reduced.  Hopefully provide him with better analgesic control.  I did not want to increase his opiate medications because then he would have difficulty postoperatively controlling his pain.]      Duration:  [ 2 months ]      Intervention:  [ none at this time. Patient is stable.  ]           Adam Herrera PA-C 10/24/24 3:38 PM

## 2025-01-02 ENCOUNTER — OFFICE VISIT (OUTPATIENT)
Dept: PAIN MEDICINE | Facility: CLINIC | Age: 45
End: 2025-01-02
Payer: MEDICARE

## 2025-01-02 VITALS
BODY MASS INDEX: 31.32 KG/M2 | SYSTOLIC BLOOD PRESSURE: 131 MMHG | HEART RATE: 76 BPM | HEIGHT: 74 IN | DIASTOLIC BLOOD PRESSURE: 90 MMHG | OXYGEN SATURATION: 98 % | RESPIRATION RATE: 18 BRPM | WEIGHT: 244 LBS

## 2025-01-02 DIAGNOSIS — G89.29 CHRONIC PAIN OF BOTH KNEES: ICD-10-CM
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
## 2025-01-02 DIAGNOSIS — M25.562 CHRONIC PAIN OF BOTH KNEES: ICD-10-CM

## 2025-01-02 DIAGNOSIS — M47.812 CERVICAL SPONDYLOSIS: ICD-10-CM

## 2025-01-02 DIAGNOSIS — Z98.1 HISTORY OF ANKLE FUSION: ICD-10-CM

## 2025-01-02 DIAGNOSIS — M50.30 DDD (DEGENERATIVE DISC DISEASE), CERVICAL: ICD-10-CM

## 2025-01-02 DIAGNOSIS — M25.571 ARTHRALGIA OF BOTH ANKLES: ICD-10-CM

## 2025-01-02 DIAGNOSIS — M25.561 CHRONIC PAIN OF BOTH KNEES: ICD-10-CM

## 2025-01-02 DIAGNOSIS — M54.12 CERVICAL RADICULOPATHY: ICD-10-CM

## 2025-01-02 DIAGNOSIS — M25.511 ACUTE PAIN OF RIGHT SHOULDER: ICD-10-CM

## 2025-01-02 DIAGNOSIS — M25.572 ARTHRALGIA OF BOTH ANKLES: ICD-10-CM

## 2025-01-02 DIAGNOSIS — M54.12 RADICULOPATHY, CERVICAL: ICD-10-CM

## 2025-01-02 PROCEDURE — G2211 COMPLEX E/M VISIT ADD ON: HCPCS | Performed by: PHYSICIAN ASSISTANT

## 2025-01-02 PROCEDURE — 3080F DIAST BP >= 90 MM HG: CPT | Performed by: PHYSICIAN ASSISTANT

## 2025-01-02 PROCEDURE — 3075F SYST BP GE 130 - 139MM HG: CPT | Performed by: PHYSICIAN ASSISTANT

## 2025-01-02 PROCEDURE — 3008F BODY MASS INDEX DOCD: CPT | Performed by: PHYSICIAN ASSISTANT

## 2025-01-02 PROCEDURE — 99214 OFFICE O/P EST MOD 30 MIN: CPT | Performed by: PHYSICIAN ASSISTANT

## 2025-01-02 RX ORDER — OXYCODONE AND ACETAMINOPHEN 7.5; 325 MG/1; MG/1
1 TABLET ORAL EVERY 8 HOURS PRN
Qty: 90 TABLET | Refills: 0 | Status: SHIPPED | OUTPATIENT
Start: 2025-01-02 | End: 2025-02-01

## 2025-01-02 RX ORDER — OXYCODONE AND ACETAMINOPHEN 7.5; 325 MG/1; MG/1
1 TABLET ORAL EVERY 8 HOURS PRN
Qty: 90 TABLET | Refills: 0 | Status: SHIPPED | OUTPATIENT
Start: 2025-02-01 | End: 2025-03-03

## 2025-01-02 RX ORDER — GABAPENTIN 800 MG/1
800 TABLET ORAL 3 TIMES DAILY
Qty: 90 TABLET | Refills: 2 | Status: SHIPPED | OUTPATIENT
Start: 2025-01-02

## 2025-01-02 ASSESSMENT — ENCOUNTER SYMPTOMS
SLEEP DISTURBANCE: 0
CHILLS: 0
SHORTNESS OF BREATH: 0
WHEEZING: 0
UNEXPECTED WEIGHT CHANGE: 0
NAUSEA: 0
ACTIVITY CHANGE: 0
VOMITING: 0
ARTHRALGIAS: 1
PALPITATIONS: 0
FEVER: 0
NECK PAIN: 1
VOICE CHANGE: 0
FATIGUE: 0
COUGH: 0
DIARRHEA: 0
PAIN: 1

## 2025-01-02 ASSESSMENT — PAIN DESCRIPTION - DESCRIPTORS: DESCRIPTORS: ACHING

## 2025-01-02 ASSESSMENT — PAIN SCALES - GENERAL
PAINLEVEL_OUTOF10: 8
PAINLEVEL_OUTOF10: 8

## 2025-01-02 ASSESSMENT — LIFESTYLE VARIABLES
HOW OFTEN DO YOU HAVE SIX OR MORE DRINKS ON ONE OCCASION: NEVER
HOW MANY STANDARD DRINKS CONTAINING ALCOHOL DO YOU HAVE ON A TYPICAL DAY: PATIENT DOES NOT DRINK
AUDIT-C TOTAL SCORE: 0
SKIP TO QUESTIONS 9-10: 1
HOW OFTEN DO YOU HAVE A DRINK CONTAINING ALCOHOL: NEVER

## 2025-01-02 ASSESSMENT — PAIN - FUNCTIONAL ASSESSMENT: PAIN_FUNCTIONAL_ASSESSMENT: 0-10

## 2025-01-02 NOTE — PROGRESS NOTES
Subjective   Patient ID: Adam Dexter is a 44 y.o. male who presents for Pain.  Patient is a 44-year-old male with history of ankle fusion continued ankle pain cervical spondylosis degenerative disc and radiculopathy the presents today for follow-up.  Patient did notes that after the prednisone taper his symptoms did reduce but not completely eliminate he does report that he is going to be moving in the next month.  He is going to be moving in with his father to provide helpful for his care and ADLs and due to this condition of his current living situation.  He is still not heard any definitive date for surgery.  He still continues to have his multiple regional pains.  Cervical tends to be worse at this time he tries to modify his activities be cautious with activities especially in the winter to reduce ice slipping and falls.    Pain  Pertinent negatives include no chest pain, diarrhea, fatigue, fever, nausea, shortness of breath, vomiting or wheezing.       Review of Systems   Constitutional:  Negative for activity change, chills, fatigue, fever and unexpected weight change.   HENT:  Negative for ear pain and voice change.    Eyes:  Negative for visual disturbance.   Respiratory:  Negative for cough, shortness of breath and wheezing.    Cardiovascular:  Negative for chest pain and palpitations.   Gastrointestinal:  Negative for diarrhea, nausea and vomiting.   Musculoskeletal:  Positive for arthralgias, gait problem and neck pain.   Psychiatric/Behavioral:  Negative for behavioral problems, self-injury, sleep disturbance and suicidal ideas.        Objective   Physical Exam  Vitals reviewed.   Constitutional:       Appearance: Normal appearance.   HENT:      Head: Normocephalic and atraumatic.      Mouth/Throat:      Mouth: Mucous membranes are moist.   Neck:      Vascular: No JVD.   Pulmonary:      Effort: Pulmonary effort is normal. No tachypnea or bradypnea.   Abdominal:      Palpations: Abdomen is soft.    Musculoskeletal:      Right ankle: Tenderness present. Decreased range of motion.      Left ankle: Tenderness present. Decreased range of motion.      Comments: Antalgic gait noted on exam. Medial tenderness of the joint line of the bilateral knees.  Gross strength in bilateral lower extremities sensation grossly intact as well    Patient has severe trapezial tenderness muscle guarding and unwilling to move neck due to the pain.   Skin:     General: Skin is warm and dry.   Neurological:      Mental Status: He is alert and oriented to person, place, and time.   Psychiatric:         Mood and Affect: Mood normal.         Behavior: Behavior normal. Behavior is cooperative.       Assessment/Plan   Problem List Items Addressed This Visit             ICD-10-CM    History of ankle fusion Z98.1    Cervical radiculopathy M54.12    Ankle joint pain M25.579    Cervical spondylosis M47.812    DDD (degenerative disc disease), cervical M50.30     Other Visit Diagnoses         Codes    Fall, initial encounter     W19.XXXA    Acute pain of right shoulder     M25.511        I had nice discussion with the patient today our plan will be as follows.      Radiology: [ none at this time ]      Physically:  [ continue modification of activities, healthy lifestyle choice he did talk about pacing getting assistance and caution on ice when doing the new transfer of living situations.]      Psychologically:  [ No acute psychological concerns. There are no mental health issues of which I am aware that are contributing to the patient's pain. There are no substance abuse or alcohol abuse issues of which I am aware that are contributing to the patient's pain. ]      Medication: [ I will refill the patient's opioids today for 2 month.  The patient continues to see benefit and improvement in their quality of life and ability to maintain ADLs.  Patient educated about the risks of taking opioids and operating a motor vehicle.  Patient reports no  adverse side effects to current medication regimen.  Current regimen does allow patient to maintain ADLs.  Patient reports no new neurologic symptoms, new pain areas, or exacerbation in pain today.  Patient reports they are happy with current treatment care path.    [place] was reviewed and was consistent with the history.    Patient has been educated on the risks, benefits, and alternatives of controlled substances as well as the proper way to store these medications.  The patient and I discussed the nature of this medication and its side effects.  We discussed tolerance, physical dependence, psychological dependence, addiction and opioid-induced hyperalgesia.  We discussed the potential need to wean from this medication.  We discussed the availability of programs that can help with this process if necessary.  We discussed safety issues related to opioids including safe storage.  We discussed the fact that the patient should not drive an automobile or operate heavy machinery while taking this medication.  A prescription for naloxone was offered to the patient.  The patient will be re-evaluated for the need to continue opioid therapy in 60 days. ]      Duration:  [ 2 months ]      Intervention:  [ none at this time. Patient is stable.  ]           Adam Herrera PA-C 01/02/25 11:45 AM

## 2025-01-02 NOTE — PROGRESS NOTES
MEDICATION NAME: Percocet  STRENGTH: 7.5-325  LAST FILL DATE: 24  DATE LAST TAKEN: 25  QUANTITY FILLED: 87  QUANTITY REMAININ  COUNT COMPLETED BY: CHAPO ZHENG RN and JOSE MEDINA      UDS LAST COMPLETED:   CONTROLLED SUBSTANCES AGREEMENT LAST SIGNED:   ORT LAST COMPLETED:  Modified [employer] disability form filled out annually.